# Patient Record
Sex: FEMALE | Race: BLACK OR AFRICAN AMERICAN | Employment: FULL TIME | URBAN - METROPOLITAN AREA
[De-identification: names, ages, dates, MRNs, and addresses within clinical notes are randomized per-mention and may not be internally consistent; named-entity substitution may affect disease eponyms.]

---

## 2018-03-28 ENCOUNTER — OFFICE VISIT (OUTPATIENT)
Dept: INTERNAL MEDICINE CLINIC | Age: 53
End: 2018-03-28

## 2018-03-28 VITALS
DIASTOLIC BLOOD PRESSURE: 89 MMHG | HEIGHT: 61 IN | BODY MASS INDEX: 27.3 KG/M2 | TEMPERATURE: 96.6 F | WEIGHT: 144.6 LBS | HEART RATE: 60 BPM | RESPIRATION RATE: 16 BRPM | SYSTOLIC BLOOD PRESSURE: 130 MMHG | OXYGEN SATURATION: 99 %

## 2018-03-28 DIAGNOSIS — E78.5 HYPERLIPIDEMIA LDL GOAL <100: Primary | ICD-10-CM

## 2018-03-28 DIAGNOSIS — Z78.0 MENOPAUSE: ICD-10-CM

## 2018-03-28 DIAGNOSIS — M81.0 OSTEOPOROSIS, UNSPECIFIED OSTEOPOROSIS TYPE, UNSPECIFIED PATHOLOGICAL FRACTURE PRESENCE: ICD-10-CM

## 2018-03-28 DIAGNOSIS — R73.03 PREDIABETES: ICD-10-CM

## 2018-03-28 RX ORDER — SIMVASTATIN 20 MG/1
20 TABLET, FILM COATED ORAL
Qty: 30 TAB | Refills: 3 | Status: SHIPPED | OUTPATIENT
Start: 2018-03-28 | End: 2018-07-26 | Stop reason: SDUPTHER

## 2018-03-28 RX ORDER — ALENDRONATE SODIUM 70 MG/1
70 TABLET ORAL
Qty: 4 TAB | Refills: 11 | Status: SHIPPED | OUTPATIENT
Start: 2018-03-28 | End: 2018-07-26 | Stop reason: SDUPTHER

## 2018-03-28 RX ORDER — SIMVASTATIN 20 MG/1
TABLET, FILM COATED ORAL
COMMUNITY
End: 2018-03-28 | Stop reason: SDUPTHER

## 2018-03-28 NOTE — PROGRESS NOTES
Chief Complaint   Patient presents with    New Patient     patient [facility] that [facility]

## 2018-03-28 NOTE — MR AVS SNAPSHOT
[facility] [address] 
[phone] Patient: La Nena Pisano 
MRN: [id number] :1965 Visit Information Date & Time Provider Department Dept. Phone Encounter #  
 3/28/2018  3:15 PM Yobany Hall MD [facility] Internal Medicine [phone] [id number] Follow-up Instructions Return in about 3 months (around 2018). Upcoming Health Maintenance Date Due Hepatitis C Screening 1965 DTaP/Tdap/Td series (1 - Tdap) 1984 PAP AKA CERVICAL CYTOLOGY 1984 BREAST CANCER SCRN MAMMOGRAM 2013 FOBT Q 1 YEAR AGE 50-75 2013 Allergies as of 3/28/2018  Review Complete On: 3/28/2018 By: Luis M Venegas MD  
  
 Severity Noted Reaction Type Reactions Advil [Ibuprofen]  2018    Swelling Current Immunizations  Never Reviewed No immunizations on file. Not reviewed this visit You Were Diagnosed With   
  
 Codes Comments Hyperlipidemia LDL goal <100    -  Primary ICD-10-CM: E78.5 ICD-9-CM: 272.4 Osteoporosis, unspecified osteoporosis type, unspecified pathological fracture presence     ICD-10-CM: M81.0 ICD-9-CM: 733.00 Vitals BP Pulse Temp Resp Height(growth percentile) Weight(growth percentile) 130/89 (BP 1 Location: Left arm, BP Patient Position: Sitting) 60 96.6 °F (35.9 °C) (Oral) 16 5' 1\" (1.549 m) 144 lb 9.6 oz (65.6 kg) SpO2 BMI OB Status Smoking Status 99% 27.32 kg/m2 Postmenopausal Former Smoker BMI and BSA Data Body Mass Index Body Surface Area  
 27.32 kg/m 2 1.68 m 2 Preferred Pharmacy Pharmacy Name Phone [id number] [place], [address] [address] [phone] Your Updated Medication List  
  
   
This list is accurate as of 3/28/18  4:08 PM.  Always use your most recent med list.  
  
  
  
  
 alendronate 70 mg tablet Commonly known as:  FOSAMAX Take 1 Tab by mouth every seven (7) days. simvastatin 20 mg tablet Commonly known as:  ZOCOR Take 1 Tab by mouth nightly. Prescriptions Sent to Pharmacy Refills  
 simvastatin (ZOCOR) 20 mg tablet 3 Sig: Take 1 Tab by mouth nightly. Class: Normal  
 Pharmacy: [facility][id number] Foss, [address] [address] Ph #: [phone] Route: Oral  
 alendronate (FOSAMAX) 70 mg tablet 11 Sig: Take 1 Tab by mouth every seven (7) days. Class: Normal  
 Pharmacy: [facility] #[id number]- Matilda Foss, [address] [address] Ph #: [phone] Route: Oral  
  
Follow-up Instructions Return in about 3 months (around 6/28/2018). Introducing [facility] [place] [facility]! Artemio Solano introduces [employer] patient portal. Now you can access parts of your medical record, email your doctor's office, and request medication refills online. 1. In your internet browser, go to [email or web address] 2. Click on the First Time User? Click Here link in the Sign In box. You will see the New Member Sign Up page. 3. Enter your Human[employer] Access Code exactly as it appears below. You will not need to use this code after youve completed the sign-up process. If you do not sign up before the expiration date, you must request a new code. · Human[employer] Access Code: [email or web address] Expires: 6/26/2018  4:08 PM 
 
4. Enter the last four digits of your Social Security Number (xxxx) and Date of Birth (mm/dd/yyyy) as indicated and click Submit. You will be taken to the next sign-up page. 5. Create a ControlCirclet ID. This will be your HumanAPI login ID and cannot be changed, so think of one that is secure and easy to remember. 6. Create a ControlCirclet password. You can change your password at any time. 7. Enter your Password Reset Question and Answer. This can be used at a later time if you forget your password. 8. Enter your e-mail address. You will receive e-mail notification when new information is available in [address] 9. Click Sign Up. You can now view and download portions of your medical record. 10. Click the Download Summary menu link to download a portable copy of your medical information. If you have questions, please visit the Frequently Asked Questions section of the [employer] website. Remember, [employer] is NOT to be used for urgent needs. For medical emergencies, dial 911. Now available from your iPhone and Android! Please provide this summary of care documentation to your next provider. If you have any questions after today's visit, please call [phone].

## 2018-03-28 NOTE — PROGRESS NOTES
Subjective:     Chief Complaint   Patient presents with   [facility] Patient     patient states that her doctor went to [facility]        She  is a 45y.o. year old female who presents today as a new patient to [facility]. Her past medical history is significant for osteoporosis, hyperlipidemia. States that she was diagnosed with osteoporosis 14 months ago and since then she has been taking Fosamax. No side effect. States that she has been taking lipitor for many years. Not sure when is the last time she had blood work done. Patient also reports that she was diagnosed with borderline diabetes. Thyroid abnormality. She was evaluated by endocrinologist. Not taking any medication. Patient states that she has anastasia experiencing menopausal symptoms. Would like to get a referral for Ob-Gyn. A comprehensive review of systems was negative except for that written in the HPI.   Objective:     Vitals:    03/28/18 1531   BP: 130/89   Pulse: 60   Resp: 16   Temp: 96.6 °F (35.9 °C)   TempSrc: Oral   SpO2: 99%   Weight: 144 lb 9.6 oz (65.6 kg)   Height: 5' 1\" (1.549 m)       Physical Examination: General appearance - alert, well appearing, and in no distress, oriented to person, place, and time and normal appearing weight  Mental status - alert, oriented to person, place, and time, normal mood, behavior, speech, dress, motor activity, and thought processes  Ears - bilateral TM's and external ear canals normal  Nose - normal and patent, no erythema, discharge or polyps  Mouth - mucous membranes moist, pharynx normal without lesions  Neck - supple, no significant adenopathy  Chest - clear to auscultation, no wheezes, rales or rhonchi, symmetric air entry  Heart - normal rate, regular rhythm, normal S1, S2, no murmurs, rubs, clicks or gallops  Neurological - alert, oriented, normal speech, no focal findings or movement disorder noted  Extremities - peripheral pulses normal, no pedal edema, no clubbing or cyanosis    Allergies   Allergen Reactions    Advil [Ibuprofen] Swelling      Social History     Social History    Marital status: SINGLE     Spouse name: N/A    Number of children: N/A    Years of education: N/A     Social History Main Topics    Smoking status: Former Smoker    Smokeless tobacco: Never Used    Alcohol use Yes    Drug use: No    Sexual activity: Yes     Partners: Male     Other Topics Concern    None     Social History Narrative    None      History reviewed. No pertinent family history. Past Surgical History:   Procedure Laterality Date    HX COLONOSCOPY      internal hemroid 2009    HX HERNIA REPAIR  2004      Past Medical History:   Diagnosis Date    Hypercholesterolemia       Current Outpatient Prescriptions   Medication Sig Dispense Refill    simvastatin (ZOCOR) 20 mg tablet Take 1 Tab by mouth nightly. 30 Tab 3    alendronate (FOSAMAX) 70 mg tablet Take 1 Tab by mouth every seven (7) days. 4 Tab 11        Assessment/ Plan:   Diagnoses and all orders for this visit:    1. Hyperlipidemia LDL goal <100  -     simvastatin (ZOCOR) 20 mg tablet; Take 1 Tab by mouth nightly. 2. Osteoporosis, unspecified osteoporosis type, unspecified pathological fracture presence  -     alendronate (FOSAMAX) 70 mg tablet; Take 1 Tab by mouth every seven (7) days. 3. Menopause  Cyndie Chandu [facility] ref [facility]    4. Prediabetes       Asked patient to sign medical release form to obtain records from her previous PCP. Depending on her lab results we will consider rechecking labs. Medication risks/benefits/costs/interactions/alternatives discussed with patient. Advised patient to call back or return to office if symptoms worsen/change/persist. If patient cannot reach us or should anything more severe/urgent arise he/she should proceed directly to the nearest emergency department. Discussed expected course/resolution/complications of diagnosis in detail with patient.   Patient given a written after visit summary which includes her diagnoses, current medications and vitals. Patient expressed understanding with the diagnosis and plan. Follow-up Disposition:  Return in about 3 months (around 6/28/2018).

## 2018-04-11 ENCOUNTER — TELEPHONE (OUTPATIENT)
Dept: INTERNAL MEDICINE CLINIC | Age: 53
End: 2018-04-11

## 2018-07-09 ENCOUNTER — TELEPHONE (OUTPATIENT)
Dept: INTERNAL MEDICINE CLINIC | Age: 53
End: 2018-07-09

## 2018-07-09 NOTE — TELEPHONE ENCOUNTER
Pt called and stated she needed to get a mammogram done, could not because Dr. Barbara Rodriguez had not signed forms that were sent. Wanted to know if forms were signed and sent back. She did not know any information about forms, just knew we needed forms signed for them to do a diagnostic mammogram.

## 2018-07-16 ENCOUNTER — LAB ONLY (OUTPATIENT)
Dept: INTERNAL MEDICINE CLINIC | Age: 53
End: 2018-07-16

## 2018-07-16 DIAGNOSIS — M81.0 OSTEOPOROSIS, UNSPECIFIED OSTEOPOROSIS TYPE, UNSPECIFIED PATHOLOGICAL FRACTURE PRESENCE: ICD-10-CM

## 2018-07-16 DIAGNOSIS — Z00.00 WELL ADULT ON ROUTINE HEALTH CHECK: Primary | ICD-10-CM

## 2018-07-18 ENCOUNTER — TELEPHONE (OUTPATIENT)
Dept: INTERNAL MEDICINE CLINIC | Age: 53
End: 2018-07-18

## 2018-07-18 LAB
25(OH)D3+25(OH)D2 SERPL-MCNC: 22.1 NG/ML (ref 30–100)
ALBUMIN SERPL-MCNC: 4.9 G/DL (ref 3.5–5.5)
ALBUMIN/GLOB SERPL: 1.8 {RATIO} (ref 1.2–2.2)
ALP SERPL-CCNC: 75 IU/L (ref 39–117)
ALT SERPL-CCNC: 14 IU/L (ref 0–32)
AST SERPL-CCNC: 20 IU/L (ref 0–40)
BASOPHILS # BLD AUTO: 0 X10E3/UL (ref 0–0.2)
BASOPHILS NFR BLD AUTO: 0 %
BILIRUB SERPL-MCNC: 0.4 MG/DL (ref 0–1.2)
BUN SERPL-MCNC: 8 MG/DL (ref 6–24)
BUN/CREAT SERPL: 13 (ref 9–23)
CALCIUM SERPL-MCNC: 9.6 MG/DL (ref 8.7–10.2)
CHLORIDE SERPL-SCNC: 97 MMOL/L (ref 96–106)
CHOLEST SERPL-MCNC: 240 MG/DL (ref 100–199)
CO2 SERPL-SCNC: 24 MMOL/L (ref 20–29)
CREAT SERPL-MCNC: 0.63 MG/DL (ref 0.57–1)
EOSINOPHIL # BLD AUTO: 0.1 X10E3/UL (ref 0–0.4)
EOSINOPHIL NFR BLD AUTO: 2 %
ERYTHROCYTE [DISTWIDTH] IN BLOOD BY AUTOMATED COUNT: 14 % (ref 12.3–15.4)
EST. AVERAGE GLUCOSE BLD GHB EST-MCNC: 117 MG/DL
GLOBULIN SER CALC-MCNC: 2.7 G/DL (ref 1.5–4.5)
GLUCOSE SERPL-MCNC: 85 MG/DL (ref 65–99)
HBA1C MFR BLD: 5.7 % (ref 4.8–5.6)
HCT VFR BLD AUTO: 39.2 % (ref 34–46.6)
HDLC SERPL-MCNC: 67 MG/DL
HGB BLD-MCNC: 12.7 G/DL (ref 11.1–15.9)
IMM GRANULOCYTES # BLD: 0 X10E3/UL (ref 0–0.1)
IMM GRANULOCYTES NFR BLD: 0 %
INTERPRETATION, 910389: NORMAL
LDLC SERPL CALC-MCNC: 146 MG/DL (ref 0–99)
LYMPHOCYTES # BLD AUTO: 2.5 X10E3/UL (ref 0.7–3.1)
LYMPHOCYTES NFR BLD AUTO: 42 %
MCH RBC QN AUTO: 29.6 PG (ref 26.6–33)
MCHC RBC AUTO-ENTMCNC: 32.4 G/DL (ref 31.5–35.7)
MCV RBC AUTO: 91 FL (ref 79–97)
MONOCYTES # BLD AUTO: 0.3 X10E3/UL (ref 0.1–0.9)
MONOCYTES NFR BLD AUTO: 5 %
NEUTROPHILS # BLD AUTO: 3.1 X10E3/UL (ref 1.4–7)
NEUTROPHILS NFR BLD AUTO: 51 %
PLATELET # BLD AUTO: 187 X10E3/UL (ref 150–379)
POTASSIUM SERPL-SCNC: 4.8 MMOL/L (ref 3.5–5.2)
PROT SERPL-MCNC: 7.6 G/DL (ref 6–8.5)
RBC # BLD AUTO: 4.29 X10E6/UL (ref 3.77–5.28)
SODIUM SERPL-SCNC: 140 MMOL/L (ref 134–144)
T4 FREE SERPL-MCNC: 1.41 NG/DL (ref 0.82–1.77)
TRIGL SERPL-MCNC: 135 MG/DL (ref 0–149)
TSH SERPL DL<=0.005 MIU/L-ACNC: 1.3 UIU/ML (ref 0.45–4.5)
VLDLC SERPL CALC-MCNC: 27 MG/DL (ref 5–40)
WBC # BLD AUTO: 6 X10E3/UL (ref 3.4–10.8)

## 2018-07-18 NOTE — PROGRESS NOTES
Please make sure she has an appointment for physical as well as to discuss lab. Will discuss labs in her CPE appointment.

## 2018-07-26 ENCOUNTER — OFFICE VISIT (OUTPATIENT)
Dept: INTERNAL MEDICINE CLINIC | Age: 53
End: 2018-07-26

## 2018-07-26 VITALS
BODY MASS INDEX: 28.13 KG/M2 | HEART RATE: 56 BPM | RESPIRATION RATE: 18 BRPM | OXYGEN SATURATION: 100 % | TEMPERATURE: 98 F | WEIGHT: 149 LBS | DIASTOLIC BLOOD PRESSURE: 75 MMHG | HEIGHT: 61 IN | SYSTOLIC BLOOD PRESSURE: 124 MMHG

## 2018-07-26 DIAGNOSIS — E55.9 VITAMIN D DEFICIENCY: ICD-10-CM

## 2018-07-26 DIAGNOSIS — G56.03 BILATERAL CARPAL TUNNEL SYNDROME: ICD-10-CM

## 2018-07-26 DIAGNOSIS — Z12.11 SCREEN FOR COLON CANCER: ICD-10-CM

## 2018-07-26 DIAGNOSIS — Z00.00 WELL ADULT ON ROUTINE HEALTH CHECK: Primary | ICD-10-CM

## 2018-07-26 DIAGNOSIS — E78.5 HYPERLIPIDEMIA LDL GOAL <100: ICD-10-CM

## 2018-07-26 DIAGNOSIS — M81.0 OSTEOPOROSIS, UNSPECIFIED OSTEOPOROSIS TYPE, UNSPECIFIED PATHOLOGICAL FRACTURE PRESENCE: ICD-10-CM

## 2018-07-26 RX ORDER — ERGOCALCIFEROL 1.25 MG/1
50000 CAPSULE ORAL
Qty: 12 CAP | Refills: 1 | Status: SHIPPED | OUTPATIENT
Start: 2018-07-26 | End: 2019-04-16 | Stop reason: SDUPTHER

## 2018-07-26 RX ORDER — SIMVASTATIN 20 MG/1
20 TABLET, FILM COATED ORAL
Qty: 30 TAB | Refills: 6 | Status: SHIPPED | OUTPATIENT
Start: 2018-07-26 | End: 2019-04-28 | Stop reason: SDUPTHER

## 2018-07-26 RX ORDER — NAPROXEN 500 MG/1
500 TABLET ORAL 2 TIMES DAILY WITH MEALS
Qty: 30 TAB | Refills: 0 | Status: SHIPPED | OUTPATIENT
Start: 2018-07-26 | End: 2019-04-15 | Stop reason: ALTCHOICE

## 2018-07-26 RX ORDER — ALENDRONATE SODIUM 70 MG/1
70 TABLET ORAL
Qty: 4 TAB | Refills: 11 | Status: SHIPPED | OUTPATIENT
Start: 2018-07-26 | End: 2019-09-09 | Stop reason: SINTOL

## 2018-07-26 NOTE — MR AVS SNAPSHOT
[address] [place] [address] 
[phone] Patient: Ciro Barros 
MRN: [id number] :1965 Visit Information Date & Time Provider Department Dept. Phone Encounter #  
 2018  2:15 PM Yobany Ramos MD [facility] Internal Medicine [phone] [id number] Follow-up Instructions Return in about 6 months (around 2019), or if symptoms worsen or fail to improve. Upcoming Health Maintenance Date Due Hepatitis C Screening 1965 COLONOSCOPY 1981 DTaP/Tdap/Td series (1 - Tdap) 1984 PAP AKA CERVICAL CYTOLOGY 1984 Influenza Age 5 to Adult 2018 BREAST CANCER SCRN MAMMOGRAM 4/3/2019 Allergies as of 2018  Review Complete On: 2018 By: Leena Navas LPN Severity Noted Reaction Type Reactions Advil [Ibuprofen]  2018    Swelling Current Immunizations  Never Reviewed No immunizations on file. Not reviewed this visit You Were Diagnosed With   
  
 Codes Comments Well adult on routine health check    -  Primary ICD-10-CM: Z00.00 ICD-9-CM: V70.0 Screen for colon cancer     ICD-10-CM: Z12.11 ICD-9-CM: V76.51 Bilateral carpal tunnel syndrome     ICD-10-CM: G56.03 
ICD-9-CM: 354.0 Vitamin D deficiency     ICD-10-CM: E55.9 ICD-9-CM: 268.9 Hyperlipidemia LDL goal <100     ICD-10-CM: E78.5 ICD-9-CM: 272.4 Osteoporosis, unspecified osteoporosis type, unspecified pathological fracture presence     ICD-10-CM: M81.0 ICD-9-CM: 733.00 Vitals BP Pulse Temp Resp Height(growth percentile) Weight(growth percentile) 124/75 (BP 1 Location: Left arm, BP Patient Position: Sitting) (!) 56 98 °F (36.7 °C) (Temporal) 18 5' 1\" (1.549 m) 149 lb (67.6 kg) SpO2 BMI OB Status Smoking Status 100% 28.15 kg/m2 Postmenopausal Former Smoker Vitals History BMI and BSA Data Body Mass Index Body Surface Area 28.15 kg/m 2 1.71 m 2 [id number] [address] [place] [address] [phone] Your Updated Medication List  
  
   
This list is accurate as of 7/26/18  2:53 PM.  Always use your most recent med list.  
  
  
  
  
 alendronate 70 mg tablet Commonly known as:  FOSAMAX Take 1 Tab by mouth every seven (7) days. ergocalciferol 50,000 unit capsule Commonly known as:  ERGOCALCIFEROL Take 1 Cap by mouth every seven (7) days. naproxen 500 mg tablet Commonly known as:  NAPROSYN Take 1 Tab by mouth two (2) times daily (with meals). simvastatin 20 mg tablet Commonly known as:  ZOCOR Take 1 Tab by mouth nightly. Prescriptions Sent to Pharmacy Refills  
 naproxen (NAPROSYN) 500 mg tablet 0 Sig: Take 1 Tab by mouth two (2) times daily (with meals). Class: Normal  
 Pharmacy: [facility]acy #[id number] [address] [address] Ph #: [phone] Route: Oral  
 ergocalciferol (ERGOCALCIFEROL) 50,000 unit capsule 1 Sig: Take 1 Cap by mouth every seven (7) days. Class: Normal  
 Pharmacy: [facility]acy #[id number] [address] [address] Ph #: [phone] Route: Oral  
 simvastatin (ZOCOR) 20 mg tablet 6 Sig: Take 1 Tab by mouth nightly. Class: Normal  
 Pharmacy: [facility]/pharmacy #[id number] [address] [address] Ph #: [phone] Route: Oral  
 alendronate (FOSAMAX) 70 mg tablet 11 Sig: Take 1 Tab by mouth every seven (7) days. Class: Normal  
 Pharmacy: [facility]/pharmacy #[id number] [address] [address] Ph #: [phone] Route: Oral  
  
We Performed the Following REFERRAL FOR COLONOSCOPY [[id number] Custom] REFERRAL TO OBSTETRICS AND GYNECOLOGY [REF51 Custom] Follow-up Instructions Return in about 6 months (around 1/26/2019), or if symptoms worsen or fail to improve. Referral Information Referral ID Referred By Referred To  
  
 [id number] REGGIE MATT MD   
   [address] [facility] [address] Phone: [phone] Fax: [phone] Visits Status Start Date End Date 1 New Request 7/26/18 7/26/19 If your referral has a status of pending review or denied, additional information will be sent to support the outcome of this decision. Referral ID Referred By Referred To  
 [id number] REGGIE MATT MD  
   [address] Phone: [phone] Fax: [phone] Visits Status Start Date End Date 1 New Request 7/26/18 7/26/19 If your referral has a status of pending review or denied, additional information will be sent to support the outcome of this decision. Introducing \[facility] [place] [facility]\"" & [facility] SERVICES! Madelyn Jimenez introduces [employer] patient portal. Now you can access parts of your medical record, email your doctor's office, and request medication refills online. 1. In your internet browser, go to [email or web address] 2. Click on the First Time User? Click Here link in the Sign In box. You will see the New Member Sign Up page. 3. Enter your [employer] Access Code exactly as it appears below. You will not need to use this code after youve completed the sign-up process. If you do not sign up before the expiration date, you must request a new code. · [employer] Access Code: [email or web address] Expires: 10/24/2018  2:20 PM 
 
4. Enter the last four digits of your Social Security Number (xxxx) and Date of Birth (mm/dd/yyyy) as indicated and click Submit. You will be taken to the next sign-up page. 5. Create a Qurit ID. This will be your [employer] login ID and cannot be changed, so think of one that is secure and easy to remember. 6. Create a Hidden Radio password. You can change your password at any time. 7. Enter your Password Reset Question and Answer. This can be used at a later time if you forget your password. 8. Enter your e-mail address. You will receive e-mail notification when new information is available in [address] 9. Click Sign Up. You can now view and download portions of your medical record. 10. Click the Download Summary menu link to download a portable copy of your medical information. If you have questions, please visit the Frequently Asked Questions section of the [employer] website. Remember, [employer] is NOT to be used for urgent needs. For medical emergencies, dial 911. Now available from your iPhone and Android! Please provide this summary of care documentation to your next provider. If you have any questions after today's visit, please call [phone].

## 2018-07-27 NOTE — PROGRESS NOTES
Subjective:   52 y.o. female for Well Woman Check as well as to discuss lab results. Her medical history is significant for osteoporosis, HLD. HLD: she is suppose to be on Zocor 20 mg but she states that she has not taken the med for the past 3-4 months. Osteoporosis: not taking Fosamax for the past 3-4 months. H/o low vit d. Not taking any supplement. Exercise regularly. She is also here with a c/o numbness feeling in her left hand more than right hand. Denies any week ness. Reports that its a constant feeling. Denies any neck, shoulder pain. 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 Need referral for Gyn for gyn exam.     Lab Results   Component Value Date/Time    VITAMIN D, 25-HYDROXY 22.1 (L) 07/16/2018 02:52 PM         Lab Results  Component Value Date/Time   WBC 6.0 07/16/2018 02:52 PM   HGB 12.7 07/16/2018 02:52 PM   HCT 39.2 07/16/2018 02:52 PM   PLATELET 494 45/01/3251 02:52 PM   MCV 91 07/16/2018 02:52 PM     Lab Results  Component Value Date/Time   Cholesterol, total 240 (H) 07/16/2018 02:52 PM   HDL Cholesterol 67 07/16/2018 02:52 PM   LDL, calculated 146 (H) 07/16/2018 02:52 PM   Triglyceride 135 07/16/2018 02:52 PM     Lab Results  Component Value Date/Time   ALT (SGPT) 14 07/16/2018 02:52 PM   AST (SGOT) 20 07/16/2018 02:52 PM   Alk.  phosphatase 75 07/16/2018 02:52 PM   Bilirubin, total 0.4 07/16/2018 02:52 PM   Albumin 4.9 07/16/2018 02:52 PM   Protein, total 7.6 07/16/2018 02:52 PM   PLATELET 890 17/38/5288 02:52 PM       Lab Results  Component Value Date/Time   GFR est non- 07/16/2018 02:52 PM   GFR est  07/16/2018 02:52 PM   Creatinine 0.63 07/16/2018 02:52 PM   BUN 8 07/16/2018 02:52 PM   Sodium 140 07/16/2018 02:52 PM   Potassium 4.8 07/16/2018 02:52 PM   Chloride 97 07/16/2018 02:52 PM   CO2 24 07/16/2018 02:52 PM     Lab Results  Component Value Date/Time   TSH 1.300 07/16/2018 02:52 PM   T4, Free 1.41 07/16/2018 02:52 PM      Lab Results   Component Value Date/Time    Hemoglobin A1c 5.7 (H) 07/16/2018 02:52 PM            Patient Active Problem List   Diagnosis Code    Hyperlipidemia LDL goal <100 E78.5    Osteoporosis M81.0    Prediabetes R73.03     Current Outpatient Prescriptions   Medication Sig Dispense Refill    naproxen (NAPROSYN) 500 mg tablet Take 1 Tab by mouth two (2) times daily (with meals). 30 Tab 0    ergocalciferol (ERGOCALCIFEROL) 50,000 unit capsule Take 1 Cap by mouth every seven (7) days. 12 Cap 1    simvastatin (ZOCOR) 20 mg tablet Take 1 Tab by mouth nightly. 30 Tab 6    alendronate (FOSAMAX) 70 mg tablet Take 1 Tab by mouth every seven (7) days. 4 Tab 11     Allergies   Allergen Reactions    Advil [Ibuprofen] Swelling     Past Medical History:   Diagnosis Date    Hypercholesterolemia      Past Surgical History:   Procedure Laterality Date    HX COLONOSCOPY      internal hemroid 2009    HX HERNIA REPAIR  2004     No family history on file. Social History   Substance Use Topics    Smoking status: Former Smoker    Smokeless tobacco: Never Used    Alcohol use Yes             ROS: refer to HPI. Feeling generally well. No TIA's or unusual headaches, no dysphagia. No prolonged cough. No dyspnea or chest pain on exertion. No abdominal pain, change in bowel habits, black or bloody stools. No urinary tract symptoms. No new or unusual musculoskeletal symptoms. Specific concerns today: refer to HPI    Objective: The patient appears well, alert, oriented x 3, in no distress. Visit Vitals    /75 (BP 1 Location: Left arm, BP Patient Position: Sitting)    Pulse (!) 56    Temp 98 °F (36.7 °C) (Temporal)    Resp 18    Ht 5' 1\" (1.549 m)    Wt 149 lb (67.6 kg)    SpO2 100%    BMI 28.15 kg/m2     ENT normal.  Neck supple. No adenopathy or thyromegaly. LAUREL. Lungs are clear, good air entry, no wheezes, rhonchi or rales. S1 and S2 normal, no murmurs, regular rate and rhythm.  Abdomen soft without tenderness, guarding, mass or organomegaly. Extremities show no edema, normal peripheral pulses. Shoulder, elbow, wrist joit exam is normal.  Tinel test was positive for tingling in all her fingers of the left hand. Neurological is normal, no focal findings. Breast and Pelvic exams are deferred. Assessment/Plan:   Well Woman  lose weight, increase physical activity, follow low fat diet, follow low salt diet, routine labs ordered, call if any problems, med compliance discussed. ICD-10-CM ICD-9-CM    1. Well adult on routine health check Z00.00 V70.0 REFERRAL FOR COLONOSCOPY      REFERRAL TO OBSTETRICS AND GYNECOLOGY   2. Screen for colon cancer Z12.11 V76.51 REFERRAL FOR COLONOSCOPY   3. Bilateral carpal tunnel syndrome G56.03 354.0    4. Vitamin D deficiency E55.9 268.9 ergocalciferol (ERGOCALCIFEROL) 50,000 unit capsule   5. Hyperlipidemia LDL goal <100 E78.5 272.4 simvastatin (ZOCOR) 20 mg tablet   6. Osteoporosis, unspecified osteoporosis type, unspecified pathological fracture presence M81.0 733.00 alendronate (FOSAMAX) 70 mg tablet     Diagnoses and all orders for this visit:    1. Well adult on routine health check  -     [facility] ref [facility]    2. Screen for colon cancer  -     REFERRAL FOR COLONOSCOPY    3. Bilateral carpal tunnel syndrome  -   start  naproxen (NAPROSYN) 500 mg tablet; Take 1 Tab by mouth two (2) times daily (with meals). - advised hand brace. - key board position to avoid compression of nerve. - will consider EMG if needed. 4. Vitamin D deficiency  -    Start  ergocalciferol (ERGOCALCIFEROL) 50,000 unit capsule; Take 1 Cap by mouth every seven (7) days. 5. Hyperlipidemia LDL goal <100  -     Restart simvastatin (ZOCOR) 20 mg tablet; Take 1 Tab by mouth nightly. 6. Osteoporosis, unspecified osteoporosis type, unspecified pathological fracture presence  -     Restart alendronate (FOSAMAX) 70 mg tablet; Take 1 Tab by mouth every seven (7) days. Records requested from last PCP. Follow-up Disposition:  Return in about 6 months (around 1/26/2019), or if symptoms worsen or fail to improve.   reviewed diet, exercise and weight control  cardiovascular risk and specific lipid/LDL goals reviewed  reviewed medications and side effects in detail

## 2019-04-15 ENCOUNTER — OFFICE VISIT (OUTPATIENT)
Dept: PRIMARY CARE CLINIC | Age: 54
End: 2019-04-15

## 2019-04-15 VITALS
HEART RATE: 56 BPM | RESPIRATION RATE: 17 BRPM | TEMPERATURE: 97.8 F | DIASTOLIC BLOOD PRESSURE: 78 MMHG | BODY MASS INDEX: 28.09 KG/M2 | SYSTOLIC BLOOD PRESSURE: 125 MMHG | OXYGEN SATURATION: 100 % | WEIGHT: 148.8 LBS | HEIGHT: 61 IN

## 2019-04-15 DIAGNOSIS — R73.03 PREDIABETES: ICD-10-CM

## 2019-04-15 DIAGNOSIS — K21.9 GASTROESOPHAGEAL REFLUX DISEASE, ESOPHAGITIS PRESENCE NOT SPECIFIED: ICD-10-CM

## 2019-04-15 DIAGNOSIS — E78.5 HYPERLIPIDEMIA LDL GOAL <100: ICD-10-CM

## 2019-04-15 DIAGNOSIS — R79.89 LOW VITAMIN D LEVEL: Primary | ICD-10-CM

## 2019-04-15 RX ORDER — RANITIDINE 150 MG/1
150 TABLET, FILM COATED ORAL
Qty: 30 TAB | Refills: 1 | Status: SHIPPED | OUTPATIENT
Start: 2019-04-15 | End: 2019-07-06 | Stop reason: SDUPTHER

## 2019-04-15 NOTE — PROGRESS NOTES
Subjective: Chief Complaint Patient presents with  Complete Physical  
 Referral Request  
  with obgyn She  is a 52y.o. year old female with medical history is significant for osteoporosis, HLD, low vit D is here for 6 months follow up. She is not due for physical.  
 
HLD: She is on Zocor 20 mg daily for HLD. Tolerating the med fine. Osteoporosis: she is not taking the fosamax and she does not want to take the med. Patient reports that she has been experiencing heart burn especially at night and in AM. She denies any triggering factors. She does mention that she eats lot of spicy food though. Denies any N/V, diarrhea. States that she takes Ranitidine sometimes which helps with symptoms. H/o low vit d. Not taking any supplement. Lab Results Component Value Date/Time VITAMIN D, 25-HYDROXY 22.1 (L) 07/16/2018 02:52 PM  
  
 
Lab Results Component Value Date/Time Hemoglobin A1c 5.7 (H) 07/16/2018 02:52 PM  
 
  
 
Exercises regularly. Pertinent items are noted in HPI. Objective:  
 
Vitals:  
 04/15/19 1280 BP: 125/78 Pulse: (!) 56 Resp: 17 Temp: 97.8 °F (36.6 °C) TempSrc: Oral  
SpO2: 100% Weight: 148 lb 12.8 oz (67.5 kg) Height: 5' 1\" (1.549 m) Physical Examination: General appearance - alert, well appearing, and in no distress, oriented to person, place, and time and overweight Mental status - alert, oriented to person, place, and time, normal mood, behavior, speech, dress, motor activity, and thought processes Chest - clear to auscultation, no wheezes, rales or rhonchi, symmetric air entry Heart - normal rate, regular rhythm, normal S1, S2, no murmurs, rubs, clicks or gallops Abdomen - soft, nontender, nondistended, no masses or organomegaly Allergies Allergen Reactions  Advil [Ibuprofen] Swelling Social History Socioeconomic History  Marital status:  Spouse name: Not on file  Number of children: Not on file  Years of education: Not on file  Highest education level: Not on file Tobacco Use  Smoking status: Former Smoker  Smokeless tobacco: Never Used Substance and Sexual Activity  Alcohol use: Yes  Drug use: No  
 Sexual activity: Yes  
  Partners: Male No family history on file. Past Surgical History:  
Procedure Laterality Date  HX COLONOSCOPY    
 internal hemroid 2009  HX HERNIA REPAIR  2004 Past Medical History:  
Diagnosis Date  Hypercholesterolemia Current Outpatient Medications Medication Sig Dispense Refill  simvastatin (ZOCOR) 20 mg tablet Take 1 Tab by mouth nightly. 30 Tab 6  ergocalciferol (ERGOCALCIFEROL) 50,000 unit capsule Take 1 Cap by mouth every seven (7) days. 12 Cap 1  
 alendronate (FOSAMAX) 70 mg tablet Take 1 Tab by mouth every seven (7) days. 4 Tab 11 Assessment/ Plan:  
Diagnoses and all orders for this visit: 1. Low vitamin D level -    She is not taking any vit D supplement. Will notify the result and further recommendation. VITAMIN D, 25 HYDROXY 2. Prediabetes -   Continue work on diet and exercise. HEMOGLOBIN A1C WITH EAG 3. Hyperlipidemia LDL goal <100 
-     LIPID PANEL 
-     METABOLIC PANEL, COMPREHENSIVE 
-    Continue zocor, will notify if any change needed. 4. Gastroesophageal reflux disease, esophagitis presence not specified -   Counseled on avoiding fatty , spicy, macias food. Start daily   raNITIdine (ZANTAC) 150 mg tablet; Take 1 Tab by mouth nightly for a month . Follow up if symptoms not better. Will consider referral to GI. Patient did not ask me any referral for Gyn during the encounter. Medication risks/benefits/costs/interactions/alternatives discussed with patient.  
Advised patient to call back or return to office if symptoms worsen/change/persist. If patient cannot reach us or should anything more severe/urgent arise he/she should proceed directly to the nearest emergency department. Discussed expected course/resolution/complications of diagnosis in detail with patient. Patient given a written after visit summary which includes her diagnoses, current medications and vitals. Patient expressed understanding with the diagnosis and plan. Follow-up and Dispositions · Return in about 3 months (around 7/29/2019) for complete physical  and fasting blood work. Cl Velázquez

## 2019-04-15 NOTE — PATIENT INSTRUCTIONS

## 2019-04-16 DIAGNOSIS — E55.9 VITAMIN D DEFICIENCY: ICD-10-CM

## 2019-04-16 LAB
25(OH)D3+25(OH)D2 SERPL-MCNC: 18 NG/ML (ref 30–100)
ALBUMIN SERPL-MCNC: 4.6 G/DL (ref 3.5–5.5)
ALBUMIN/GLOB SERPL: 1.9 {RATIO} (ref 1.2–2.2)
ALP SERPL-CCNC: 77 IU/L (ref 39–117)
ALT SERPL-CCNC: 18 IU/L (ref 0–32)
AST SERPL-CCNC: 21 IU/L (ref 0–40)
BILIRUB SERPL-MCNC: 0.5 MG/DL (ref 0–1.2)
BUN SERPL-MCNC: 8 MG/DL (ref 6–24)
BUN/CREAT SERPL: 14 (ref 9–23)
CALCIUM SERPL-MCNC: 9.4 MG/DL (ref 8.7–10.2)
CHLORIDE SERPL-SCNC: 102 MMOL/L (ref 96–106)
CHOLEST SERPL-MCNC: 204 MG/DL (ref 100–199)
CO2 SERPL-SCNC: 26 MMOL/L (ref 20–29)
CREAT SERPL-MCNC: 0.59 MG/DL (ref 0.57–1)
EST. AVERAGE GLUCOSE BLD GHB EST-MCNC: 117 MG/DL
GLOBULIN SER CALC-MCNC: 2.4 G/DL (ref 1.5–4.5)
GLUCOSE SERPL-MCNC: 94 MG/DL (ref 65–99)
HBA1C MFR BLD: 5.7 % (ref 4.8–5.6)
HDLC SERPL-MCNC: 64 MG/DL
LDLC SERPL CALC-MCNC: 124 MG/DL (ref 0–99)
POTASSIUM SERPL-SCNC: 4.8 MMOL/L (ref 3.5–5.2)
PROT SERPL-MCNC: 7 G/DL (ref 6–8.5)
SODIUM SERPL-SCNC: 141 MMOL/L (ref 134–144)
TRIGL SERPL-MCNC: 82 MG/DL (ref 0–149)
VLDLC SERPL CALC-MCNC: 16 MG/DL (ref 5–40)

## 2019-04-16 RX ORDER — ERGOCALCIFEROL 1.25 MG/1
50000 CAPSULE ORAL
Qty: 8 CAP | Refills: 0 | Status: SHIPPED | OUTPATIENT
Start: 2019-04-16 | End: 2020-06-10

## 2019-04-16 NOTE — PROGRESS NOTES
Please call patient: 
 
1. Kidney, liver level is within normal range. 2. Over all cholesterol level is better and stable. Continue current med and  Continue watching your diet, eat healthy, less macias and fatty food, more baked or grilled or broiled food. Exercise 150 minutes/week will be helpful as well. Will recheck level in 6 months. 3. Vit d level is low and lower than last time. Start taking Vit D 50,000 IU one tab per week for 8 weeks followed by OTC 2,000 IU vit D daily. Increase vit D rich food. Exposed to sun total of 60 minutes /week will help. Will recheck level in 6 months. Prescription sent to his pharmacy. 4. Prediabetes: Average blood sugar( Hg A1c ) is 5.7, no change from last time, in the range of prediabetic level. Prediabetes is a warning sign that you are at risk for getting type 2 diabetes. It means that your blood sugar is higher than it should be. Most people who get type 2 diabetes have prediabetes first. The good news is that lifestyle changes may help you get your blood sugar back to normal and avoid or delay diabetes. Will recheck this level in 6 months

## 2019-04-17 ENCOUNTER — TELEPHONE (OUTPATIENT)
Dept: PRIMARY CARE CLINIC | Age: 54
End: 2019-04-17

## 2019-04-17 NOTE — TELEPHONE ENCOUNTER
----- Message from Myra Guzman MD sent at 4/16/2019  4:49 PM EDT -----  Please call patient:    1. Kidney, liver level is within normal range. 2. Over all cholesterol level is better and stable. Continue current med and  Continue watching your diet, eat healthy, less macias and fatty food, more baked or grilled or broiled food. Exercise 150 minutes/week will be helpful as well. Will recheck level in 6 months. 3. Vit d level is low and lower than last time. Start taking Vit D 50,000 IU one tab per week for 8 weeks followed by OTC 2,000 IU vit D daily. Increase vit D rich food. Exposed to sun total of 60 minutes /week will help. Will recheck level in 6 months. Prescription sent to his pharmacy. 4. Prediabetes: Average blood sugar( Hg A1c ) is 5.7, no change from last time, in the range of prediabetic level. Prediabetes is a warning sign that you are at risk for getting type 2 diabetes. It means that your blood sugar is higher than it should be. Most people who get type 2 diabetes have prediabetes first. The good news is that lifestyle changes may help you get your blood sugar back to normal and avoid or delay diabetes.   Will recheck this level in 6 months

## 2019-04-17 NOTE — TELEPHONE ENCOUNTER
Discussed results and recommendations. Pt verbalized her understanding. Will like results to be mailed to her home.

## 2019-04-28 DIAGNOSIS — E78.5 HYPERLIPIDEMIA LDL GOAL <100: ICD-10-CM

## 2019-04-29 RX ORDER — SIMVASTATIN 20 MG/1
TABLET, FILM COATED ORAL
Qty: 30 TAB | Refills: 5 | Status: SHIPPED | OUTPATIENT
Start: 2019-04-29 | End: 2020-02-19 | Stop reason: SDUPTHER

## 2019-05-10 ENCOUNTER — TELEPHONE (OUTPATIENT)
Dept: PRIMARY CARE CLINIC | Age: 54
End: 2019-05-10

## 2019-05-10 DIAGNOSIS — K21.9 GASTROESOPHAGEAL REFLUX DISEASE, ESOPHAGITIS PRESENCE NOT SPECIFIED: Primary | ICD-10-CM

## 2019-05-10 NOTE — TELEPHONE ENCOUNTER
PT. States that her heartburn is not getting better and she is requesting Endoscopy. PT. May be reached with a plan of care @ [phone].

## 2019-07-06 DIAGNOSIS — K21.9 GASTROESOPHAGEAL REFLUX DISEASE, ESOPHAGITIS PRESENCE NOT SPECIFIED: ICD-10-CM

## 2019-07-08 RX ORDER — RANITIDINE 150 MG/1
TABLET, FILM COATED ORAL
Qty: 90 TAB | Refills: 1 | Status: SHIPPED | OUTPATIENT
Start: 2019-07-08 | End: 2019-10-24

## 2019-08-27 ENCOUNTER — TELEPHONE (OUTPATIENT)
Dept: PRIMARY CARE CLINIC | Age: 54
End: 2019-08-27

## 2019-08-27 NOTE — TELEPHONE ENCOUNTER
Pt would like to know the process of getting breast reduction. Breasts are causing back and neck pain. States that recent bone density also shows osteoporosis. Considering having a breast reduction.

## 2019-08-27 NOTE — TELEPHONE ENCOUNTER
She needs to see a plastic surgeon for that. She can come to evaluate her back pain and referral if needed.

## 2019-08-29 ENCOUNTER — TELEPHONE (OUTPATIENT)
Dept: PRIMARY CARE CLINIC | Age: 54
End: 2019-08-29

## 2019-08-29 NOTE — TELEPHONE ENCOUNTER
Patient called regarding same matter that she spoke with someone about a few days ago would not disclose any additional info [phone]

## 2019-08-29 NOTE — TELEPHONE ENCOUNTER
Pt states that insurance gave her a list of plastic surgeons she can see and wanted to know if  knew anyone personally. Advised pt to look through the list and decide then let us know so that we can give her the referral. Pt verbalized her understanding.

## 2019-08-30 ENCOUNTER — TELEPHONE (OUTPATIENT)
Dept: PRIMARY CARE CLINIC | Age: 54
End: 2019-08-30

## 2019-08-30 NOTE — TELEPHONE ENCOUNTER
LVM informing pt that received her Dexa scan results. She needs an appt to discuss treatment options for osteoporosis.

## 2019-09-09 ENCOUNTER — OFFICE VISIT (OUTPATIENT)
Dept: PRIMARY CARE CLINIC | Age: 54
End: 2019-09-09

## 2019-09-09 VITALS
BODY MASS INDEX: 27 KG/M2 | DIASTOLIC BLOOD PRESSURE: 85 MMHG | TEMPERATURE: 98.5 F | WEIGHT: 143 LBS | HEART RATE: 64 BPM | RESPIRATION RATE: 16 BRPM | HEIGHT: 61 IN | OXYGEN SATURATION: 100 % | SYSTOLIC BLOOD PRESSURE: 136 MMHG

## 2019-09-09 DIAGNOSIS — M81.0 OSTEOPOROSIS, UNSPECIFIED OSTEOPOROSIS TYPE, UNSPECIFIED PATHOLOGICAL FRACTURE PRESENCE: Primary | ICD-10-CM

## 2019-09-09 DIAGNOSIS — N64.81 PTOSIS OF BREAST: ICD-10-CM

## 2019-09-09 DIAGNOSIS — R79.89 LOW VITAMIN D LEVEL: ICD-10-CM

## 2019-09-09 NOTE — PROGRESS NOTES
Identified pt with two pt identifiers(name and ). Reviewed record in preparation for visit and have obtained necessary documentation. Chief Complaint   Patient presents with    Osteoporosis        Health Maintenance Due   Topic    Hepatitis C Screening     DTaP/Tdap/Td series (1 - Tdap)    PAP AKA CERVICAL CYTOLOGY     Shingrix Vaccine Age 50> (1 of 2)    Bone Densitometry     BREAST CANCER SCRN MAMMOGRAM     Influenza Age 5 to Adult         Visit Vitals  /85 (BP 1 Location: Right arm, BP Patient Position: Sitting)   Pulse 64   Temp 98.5 °F (36.9 °C) (Oral)   Resp 16   Ht 5' 1\" (1.549 m)   Wt 143 lb (64.9 kg)   SpO2 100%   BMI 27.02 kg/m²     Pain Scale: /10    Coordination of Care Questionnaire:  :   1. Have you been to the ER, urgent care clinic since your last visit? Hospitalized since your last visit? No    2. Have you seen or consulted any other health care providers outside of the [address] [place] [address] since your last visit? Include any pap smears or colon screening.  No

## 2019-09-09 NOTE — PROGRESS NOTES
Subjective:     Chief Complaint   Patient presents with    Osteoporosis        She  is a 62y.o. year old female who presents today to discuss about DEXA scan result done on 7/23/2019 . DEXA scan is positive for osteoporosis. No fracture. Patient reports that she continue to have pain in her neck, back, shoulder. She received [place] back in [place] for the pain but nothing worked. She worry that her heavy breast is causing the symptoms. She made an appointment with Plastic surgeon tomorrow to discuss about this. Hx of low vit d. She was treated with 50,000 iu vit d I April, she is currently on Vit D 2,000 iu daily. Lab Results   Component Value Date/Time    VITAMIN D, 25-HYDROXY 18.0 (L) 04/15/2019 10:10 AM           Pertinent items are noted in HPI. Objective:     Vitals:    09/09/19 1529   BP: 136/85   Pulse: 64   Resp: 16   Temp: 98.5 °F (36.9 °C)   TempSrc: Oral   SpO2: 100%   Weight: 143 lb (64.9 kg)   Height: 5' 1\" (1.549 m)       Physical Examination: General appearance - alert, well appearing, and in no distress, oriented to person, place, and time and overweight  Mental status - alert, oriented to person, place, and time  Neck - supple, no significant adenopathy, Flexion,extension is slightly restricted with movement. She has indentation in her both side of the shoulder near bra line. Chest - clear to auscultation, no wheezes, rales or rhonchi, symmetric air entry  Heart - normal rate, regular rhythm, normal S1, S2, no murmurs, rubs, clicks or gallops    Allergies   Allergen Reactions    Advil [Ibuprofen] Swelling      Social History     Socioeconomic History    Marital status:      Spouse name: Not on file    Number of children: Not on file    Years of education: Not on file    Highest education level: Not on file   Tobacco Use    Smoking status: Former Smoker    Smokeless tobacco: Never Used   Substance and Sexual Activity    Alcohol use:  Yes    Drug use: No    Sexual activity: Yes     Partners: Male      No family history on file. Past Surgical History:   Procedure Laterality Date    HX COLONOSCOPY      internal hemroid 2009    HX HERNIA REPAIR  2004      Past Medical History:   Diagnosis Date    Hypercholesterolemia       Current Outpatient Medications   Medication Sig Dispense Refill    denosumab (PROLIA) 60 mg/mL injection 1 mL by SubCUTAneous route every 6 months. 1 mL 0    raNITIdine (ZANTAC) 150 mg tablet TAKE 1 TABLET BY MOUTH EVERY NIGHT 90 Tab 1    simvastatin (ZOCOR) 20 mg tablet TAKE 1 TABLET BY MOUTH EVERY NIGHT 30 Tab 5    ergocalciferol (ERGOCALCIFEROL) 50,000 unit capsule Take 1 Cap by mouth every seven (7) days. 8 Cap 0        Assessment/ Plan:   Diagnoses and all orders for this visit:    1. Osteoporosis, unspecified osteoporosis type, unspecified pathological fracture presence  -   Pt could not tolerate Fosamax due to GI side effects. denosumab (PROLIA) 60 mg/mL injection; 1 mL by SubCUTAneous route every 6 months. 2. Low vitamin D level  -     VITAMIN D, 25 HYDROXY               Will notify result and recommendation. 3. Ptosis of breast      Patient continue to have pain in her neck, back, shoulder. Source is likely the heavy breast.      She has an appointment with plastic surgeon tomorrow. Medication risks/benefits/costs/interactions/alternatives discussed with patient. Advised patient to call back or return to office if symptoms worsen/change/persist. If patient cannot reach us or should anything more severe/urgent arise he/she should proceed directly to the nearest emergency department. Discussed expected course/resolution/complications of diagnosis in detail with patient. Patient given a written after visit summary which includes her diagnoses, current medications and vitals. Patient expressed understanding with the diagnosis and plan.           Follow-up and Dispositions    · Return in about 3 months (around 12/9/2019), or if symptoms worsen or fail to improve, for complete physical  and fasting blood work., have fasting blood work done 2-3 days prior. Lord Rodriguez

## 2019-09-17 ENCOUNTER — TELEPHONE (OUTPATIENT)
Dept: PRIMARY CARE CLINIC | Age: 54
End: 2019-09-17

## 2019-09-17 NOTE — TELEPHONE ENCOUNTER
I spoke with patient over phone. She was giving me up dates on seeing the plastic surgeon for possible breast reduction.

## 2019-09-17 NOTE — TELEPHONE ENCOUNTER
[place] is going overseas and needs to have some questions answered prior to leaving pleas call patient back [phone]

## 2019-09-17 NOTE — TELEPHONE ENCOUNTER
Pt states that she is going out of the country today. She would like to  only about her chronic shoulder and neck pain.

## 2019-10-24 ENCOUNTER — HOSPITAL ENCOUNTER (OUTPATIENT)
Dept: PREADMISSION TESTING | Age: 54
Discharge: HOME | End: 2019-10-24
Payer: COMMERCIAL

## 2019-10-24 VITALS
DIASTOLIC BLOOD PRESSURE: 65 MMHG | TEMPERATURE: 97.6 F | WEIGHT: 146 LBS | HEIGHT: 61 IN | HEART RATE: 54 BPM | OXYGEN SATURATION: 98 % | BODY MASS INDEX: 27.56 KG/M2 | SYSTOLIC BLOOD PRESSURE: 134 MMHG | RESPIRATION RATE: 18 BRPM

## 2019-10-24 LAB
ALBUMIN SERPL-MCNC: 4.6 G/DL (ref 3.5–5)
ALBUMIN/GLOB SERPL: 1.2 {RATIO} (ref 1.1–2.2)
ALP SERPL-CCNC: 79 U/L (ref 45–117)
ALT SERPL-CCNC: 22 U/L (ref 12–78)
ANION GAP SERPL CALC-SCNC: 5 MMOL/L (ref 5–15)
APTT PPP: 29.8 SEC (ref 22.1–32)
AST SERPL-CCNC: 18 U/L (ref 15–37)
BASOPHILS # BLD: 0 K/UL (ref 0–0.1)
BASOPHILS NFR BLD: 0 % (ref 0–1)
BILIRUB SERPL-MCNC: 0.5 MG/DL (ref 0.2–1)
BUN SERPL-MCNC: 12 MG/DL (ref 6–20)
BUN/CREAT SERPL: 21 (ref 12–20)
CALCIUM SERPL-MCNC: 9.7 MG/DL (ref 8.5–10.1)
CHLORIDE SERPL-SCNC: 104 MMOL/L (ref 97–108)
CO2 SERPL-SCNC: 29 MMOL/L (ref 21–32)
CREAT SERPL-MCNC: 0.58 MG/DL (ref 0.55–1.02)
DIFFERENTIAL METHOD BLD: NORMAL
EOSINOPHIL # BLD: 0.1 K/UL (ref 0–0.4)
EOSINOPHIL NFR BLD: 1 % (ref 0–7)
ERYTHROCYTE [DISTWIDTH] IN BLOOD BY AUTOMATED COUNT: 13.2 % (ref 11.5–14.5)
GLOBULIN SER CALC-MCNC: 3.9 G/DL (ref 2–4)
GLUCOSE SERPL-MCNC: 93 MG/DL (ref 65–100)
HCT VFR BLD AUTO: 41.5 % (ref 35–47)
HGB BLD-MCNC: 13.4 G/DL (ref 11.5–16)
IMM GRANULOCYTES # BLD AUTO: 0 K/UL (ref 0–0.04)
IMM GRANULOCYTES NFR BLD AUTO: 0 % (ref 0–0.5)
INR PPP: 1 (ref 0.9–1.1)
LYMPHOCYTES # BLD: 2.3 K/UL (ref 0.8–3.5)
LYMPHOCYTES NFR BLD: 36 % (ref 12–49)
MCH RBC QN AUTO: 29.9 PG (ref 26–34)
MCHC RBC AUTO-ENTMCNC: 32.3 G/DL (ref 30–36.5)
MCV RBC AUTO: 92.6 FL (ref 80–99)
MONOCYTES # BLD: 0.4 K/UL (ref 0–1)
MONOCYTES NFR BLD: 6 % (ref 5–13)
NEUTS SEG # BLD: 3.7 K/UL (ref 1.8–8)
NEUTS SEG NFR BLD: 57 % (ref 32–75)
NRBC # BLD: 0 K/UL (ref 0–0.01)
NRBC BLD-RTO: 0 PER 100 WBC
PLATELET # BLD AUTO: 208 K/UL (ref 150–400)
PMV BLD AUTO: 12.5 FL (ref 8.9–12.9)
POTASSIUM SERPL-SCNC: 5.2 MMOL/L (ref 3.5–5.1)
PROT SERPL-MCNC: 8.5 G/DL (ref 6.4–8.2)
PROTHROMBIN TIME: 10.3 SEC (ref 9–11.1)
RBC # BLD AUTO: 4.48 M/UL (ref 3.8–5.2)
SODIUM SERPL-SCNC: 138 MMOL/L (ref 136–145)
THERAPEUTIC RANGE,PTTT: NORMAL SECS (ref 58–77)
WBC # BLD AUTO: 6.5 K/UL (ref 3.6–11)

## 2019-10-24 PROCEDURE — 85610 PROTHROMBIN TIME: CPT

## 2019-10-24 PROCEDURE — 80053 COMPREHEN METABOLIC PANEL: CPT

## 2019-10-24 PROCEDURE — 36415 COLL VENOUS BLD VENIPUNCTURE: CPT

## 2019-10-24 PROCEDURE — 85025 COMPLETE CBC W/AUTO DIFF WBC: CPT

## 2019-10-24 PROCEDURE — 85730 THROMBOPLASTIN TIME PARTIAL: CPT

## 2019-10-24 PROCEDURE — 93005 ELECTROCARDIOGRAM TRACING: CPT

## 2019-10-24 RX ORDER — CHOLECALCIFEROL (VITAMIN D3) 125 MCG
15 CAPSULE ORAL
COMMUNITY
End: 2020-02-19 | Stop reason: ALTCHOICE

## 2019-10-24 RX ORDER — PANTOPRAZOLE SODIUM 40 MG/1
40 TABLET, DELAYED RELEASE ORAL
COMMUNITY
End: 2022-01-14 | Stop reason: SDUPTHER

## 2019-10-24 NOTE — PERIOP NOTES
[address], [address] [facility]                            MAIN OR                                  [phone]   MAIN PRE OP                          [phone]                                                                                AMBULATORY PRE OP          [phone]  PRE-ADMISSION TESTING    [phone]     Surgery Date:   Monday, October 28       Is surgery arrival time given by surgeon? NO  If [facility] staff will call you Friday, October 25 between 3 and 7pm the day before your surgery with your arrival time. (If your surgery is on a Monday, we will call you the Friday before.)    Call [phone] after 7pm Monday-Friday if you did not receive your arrival time. INSTRUCTIONS BEFORE YOUR SURGERY   When You  Arrive   Arrive at the [address] [facility] on the day of your surgery  Have your insurance card, photo ID, and any copayment (if needed)     Food   and   Drink   NO food or drink after midnight the night before surgery    This means NO water, gum, mints, coffee, juice, etc.  No alcohol (beer, wine, liquor) 24 hours before and after surgery     Medications to   TAKE   Morning of Surgery   MEDICATIONS TO TAKE THE MORNING OF SURGERY WITH A SIP OF WATER:          PROTONIX,    Medications  To  STOP      7 days before surgery    Non-Steroidal anti-inflammatory Drugs (NSAID's): for example, Ibuprofen (Advil, Motrin), Naproxen (Aleve)   Aspirin, if taking for pain    Herbal supplements, vitamins, and fish oil   Other:  (Pain medications not listed above, including Tylenol may be taken)   Blood  Thinners    If you take  Aspirin, Plavix, Coumadin, or any blood-thinning or anti-blood clot medicine, talk to the doctor who prescribed the medications for pre-operative instructions.      Bathing Clothing  Jewelry  Valuables       If you shower the morning of surgery, please do not apply anything to your skin (lotions, powders, deodorant, or makeup, especially mascara)   Follow all special bath instructions (for total joint replacement, spine and bowel surgeries)   Do not shave or trim anywhere 24 hours before surgery   Wear your hair loose or down; no pony-tails, buns, or metal hair clips   Wear loose, comfortable, clean clothes   Wear glasses instead of contacts  [employer] money, valuables, and jewelry, including body piercings, at home     Going Home       or Spending the Night       ADMITS: If your doctor is keeping you into the hospital after surgery, leave personal belongings/luggage in your car until you have a hospital room number. Hospital discharge time is 12 noon  Drivers must be here before 12 noon unless you are told differently   Special Instructions Chlorhexadine Kit  Free  Parking         Follow all instructions so your surgery wont be cancelled. Please, be on time. If a situation occurs and you are delayed the day of surgery, call [phone] or          [phone]. If your physical condition changes (like a fever, cold, flu, etc.) call your surgeon. The patient was contacted  in person. Home medication reviewed and verified during PAT appointment. The patient verbalizes understanding of all instructions and does not  need reinforcement.

## 2019-10-25 ENCOUNTER — ANESTHESIA EVENT (OUTPATIENT)
Dept: SURGERY | Age: 54
DRG: 578 | End: 2019-10-25
Payer: COMMERCIAL

## 2019-10-25 LAB
ATRIAL RATE: 54 BPM
CALCULATED P AXIS, ECG09: 45 DEGREES
CALCULATED R AXIS, ECG10: 9 DEGREES
CALCULATED T AXIS, ECG11: 38 DEGREES
DIAGNOSIS, 93000: NORMAL
P-R INTERVAL, ECG05: 156 MS
Q-T INTERVAL, ECG07: 468 MS
QRS DURATION, ECG06: 74 MS
QTC CALCULATION (BEZET), ECG08: 443 MS
VENTRICULAR RATE, ECG03: 54 BPM

## 2019-10-28 ENCOUNTER — HOSPITAL ENCOUNTER (INPATIENT)
Age: 54
LOS: 2 days | Discharge: HOME | DRG: 578 | End: 2019-10-30
Attending: SURGERY | Admitting: SURGERY
Payer: COMMERCIAL

## 2019-10-28 ENCOUNTER — ANESTHESIA (OUTPATIENT)
Dept: SURGERY | Age: 54
DRG: 578 | End: 2019-10-28
Payer: COMMERCIAL

## 2019-10-28 PROBLEM — N62 MACROMASTIA: Status: ACTIVE | Noted: 2019-10-28

## 2019-10-28 PROCEDURE — 77030011825 HC SUPP SURG PSTOP S2SG -B: Performed by: SURGERY

## 2019-10-28 PROCEDURE — 99218 HC RM OBSERVATION: CPT

## 2019-10-28 PROCEDURE — 77030018836 HC SOL IRR NACL ICUM -A: Performed by: SURGERY

## 2019-10-28 PROCEDURE — 74011250637 HC RX REV CODE- 250/637: Performed by: SURGERY

## 2019-10-28 PROCEDURE — 77030011640 HC PAD GRND REM COVD -A: Performed by: SURGERY

## 2019-10-28 PROCEDURE — 77030010507 HC ADH SKN DERMBND J&J -B: Performed by: SURGERY

## 2019-10-28 PROCEDURE — 76210000035 HC AMBSU PH I REC 1 TO 1.5 HR: Performed by: SURGERY

## 2019-10-28 PROCEDURE — 65270000029 HC RM PRIVATE

## 2019-10-28 PROCEDURE — 77030040361 HC SLV COMPR DVT MDII -B

## 2019-10-28 PROCEDURE — 77030020255 HC SOL INJ LR 1000ML BG: Performed by: SURGERY

## 2019-10-28 PROCEDURE — 77030002986 HC SUT PROL J&J -A: Performed by: SURGERY

## 2019-10-28 PROCEDURE — 77030008463 HC STPLR SKN PROX J&J -B: Performed by: SURGERY

## 2019-10-28 PROCEDURE — 76060000064 HC AMB SURG ANES 2 TO 2.5 HR: Performed by: SURGERY

## 2019-10-28 PROCEDURE — 77030031139 HC SUT VCRL2 J&J -A: Performed by: SURGERY

## 2019-10-28 PROCEDURE — 76030000004 HC AMB SURG OR TIME 2 TO 2.5: Performed by: SURGERY

## 2019-10-28 PROCEDURE — 74011000250 HC RX REV CODE- 250: Performed by: ANESTHESIOLOGY

## 2019-10-28 PROCEDURE — 77030008534 HC TBNG LIPOSUC BYRO -B: Performed by: SURGERY

## 2019-10-28 PROCEDURE — 74011250637 HC RX REV CODE- 250/637: Performed by: PHYSICIAN ASSISTANT

## 2019-10-28 PROCEDURE — 74011250636 HC RX REV CODE- 250/636: Performed by: ANESTHESIOLOGY

## 2019-10-28 PROCEDURE — 77030020262 HC SOL INJ SOD CL 0.9% 100ML: Performed by: SURGERY

## 2019-10-28 PROCEDURE — 77030008684 HC TU ET CUF COVD -B: Performed by: ANESTHESIOLOGY

## 2019-10-28 PROCEDURE — 74011000250 HC RX REV CODE- 250: Performed by: PHYSICIAN ASSISTANT

## 2019-10-28 PROCEDURE — 77030040506 HC DRN WND MDII -A: Performed by: SURGERY

## 2019-10-28 PROCEDURE — 74011000250 HC RX REV CODE- 250: Performed by: SURGERY

## 2019-10-28 PROCEDURE — 77030019940 HC BLNKT HYPOTHRM STRY -B: Performed by: SURGERY

## 2019-10-28 PROCEDURE — 77030028700 HC BLD TISS PLSM MEDT -E: Performed by: SURGERY

## 2019-10-28 PROCEDURE — 88305 TISSUE EXAM BY PATHOLOGIST: CPT

## 2019-10-28 PROCEDURE — 74011250636 HC RX REV CODE- 250/636: Performed by: PHYSICIAN ASSISTANT

## 2019-10-28 PROCEDURE — 77030002933 HC SUT MCRYL J&J -A: Performed by: SURGERY

## 2019-10-28 PROCEDURE — 77030002916 HC SUT ETHLN J&J -A: Performed by: SURGERY

## 2019-10-28 PROCEDURE — 51798 US URINE CAPACITY MEASURE: CPT

## 2019-10-28 RX ORDER — HYDROMORPHONE HYDROCHLORIDE 1 MG/ML
.25-1 INJECTION, SOLUTION INTRAMUSCULAR; INTRAVENOUS; SUBCUTANEOUS
Status: DISCONTINUED | OUTPATIENT
Start: 2019-10-28 | End: 2019-10-28 | Stop reason: HOSPADM

## 2019-10-28 RX ORDER — DIPHENHYDRAMINE HCL 25 MG
25 CAPSULE ORAL
Status: DISCONTINUED | OUTPATIENT
Start: 2019-10-28 | End: 2019-10-30 | Stop reason: HOSPADM

## 2019-10-28 RX ORDER — SIMVASTATIN 20 MG/1
20 TABLET, FILM COATED ORAL DAILY
Status: DISCONTINUED | OUTPATIENT
Start: 2019-10-29 | End: 2019-10-30 | Stop reason: HOSPADM

## 2019-10-28 RX ORDER — SODIUM CHLORIDE, SODIUM LACTATE, POTASSIUM CHLORIDE, CALCIUM CHLORIDE 600; 310; 30; 20 MG/100ML; MG/100ML; MG/100ML; MG/100ML
INJECTION, SOLUTION INTRAVENOUS
Status: DISCONTINUED | OUTPATIENT
Start: 2019-10-28 | End: 2019-10-28 | Stop reason: HOSPADM

## 2019-10-28 RX ORDER — MORPHINE SULFATE 2 MG/ML
2 INJECTION, SOLUTION INTRAMUSCULAR; INTRAVENOUS
Status: DISCONTINUED | OUTPATIENT
Start: 2019-10-28 | End: 2019-10-30 | Stop reason: HOSPADM

## 2019-10-28 RX ORDER — PANTOPRAZOLE SODIUM 40 MG/1
40 TABLET, DELAYED RELEASE ORAL
Status: DISCONTINUED | OUTPATIENT
Start: 2019-10-28 | End: 2019-10-30 | Stop reason: HOSPADM

## 2019-10-28 RX ORDER — MIDAZOLAM HYDROCHLORIDE 1 MG/ML
INJECTION, SOLUTION INTRAMUSCULAR; INTRAVENOUS AS NEEDED
Status: DISCONTINUED | OUTPATIENT
Start: 2019-10-28 | End: 2019-10-28 | Stop reason: HOSPADM

## 2019-10-28 RX ORDER — DIPHENHYDRAMINE HYDROCHLORIDE 50 MG/ML
12.5 INJECTION, SOLUTION INTRAMUSCULAR; INTRAVENOUS AS NEEDED
Status: DISCONTINUED | OUTPATIENT
Start: 2019-10-28 | End: 2019-10-28 | Stop reason: HOSPADM

## 2019-10-28 RX ORDER — DIAZEPAM 5 MG/1
5 TABLET ORAL
Status: DISCONTINUED | OUTPATIENT
Start: 2019-10-28 | End: 2019-10-30 | Stop reason: HOSPADM

## 2019-10-28 RX ORDER — NALOXONE HYDROCHLORIDE 0.4 MG/ML
0.4 INJECTION, SOLUTION INTRAMUSCULAR; INTRAVENOUS; SUBCUTANEOUS AS NEEDED
Status: DISCONTINUED | OUTPATIENT
Start: 2019-10-28 | End: 2019-10-30 | Stop reason: HOSPADM

## 2019-10-28 RX ORDER — ONDANSETRON 4 MG/1
4 TABLET, ORALLY DISINTEGRATING ORAL
Status: DISCONTINUED | OUTPATIENT
Start: 2019-10-28 | End: 2019-10-30 | Stop reason: HOSPADM

## 2019-10-28 RX ORDER — LIDOCAINE HYDROCHLORIDE 10 MG/ML
INJECTION INFILTRATION; PERINEURAL AS NEEDED
Status: DISCONTINUED | OUTPATIENT
Start: 2019-10-28 | End: 2019-10-28 | Stop reason: HOSPADM

## 2019-10-28 RX ORDER — DEXAMETHASONE SODIUM PHOSPHATE 4 MG/ML
INJECTION, SOLUTION INTRA-ARTICULAR; INTRALESIONAL; INTRAMUSCULAR; INTRAVENOUS; SOFT TISSUE AS NEEDED
Status: DISCONTINUED | OUTPATIENT
Start: 2019-10-28 | End: 2019-10-28 | Stop reason: HOSPADM

## 2019-10-28 RX ORDER — LIDOCAINE HYDROCHLORIDE 10 MG/ML
0.1 INJECTION, SOLUTION EPIDURAL; INFILTRATION; INTRACAUDAL; PERINEURAL AS NEEDED
Status: DISCONTINUED | OUTPATIENT
Start: 2019-10-28 | End: 2019-10-28 | Stop reason: HOSPADM

## 2019-10-28 RX ORDER — PROPOFOL 10 MG/ML
INJECTION, EMULSION INTRAVENOUS AS NEEDED
Status: DISCONTINUED | OUTPATIENT
Start: 2019-10-28 | End: 2019-10-28 | Stop reason: HOSPADM

## 2019-10-28 RX ORDER — NALOXONE HYDROCHLORIDE 0.4 MG/ML
0.2 INJECTION, SOLUTION INTRAMUSCULAR; INTRAVENOUS; SUBCUTANEOUS
Status: DISCONTINUED | OUTPATIENT
Start: 2019-10-28 | End: 2019-10-28 | Stop reason: HOSPADM

## 2019-10-28 RX ORDER — ONDANSETRON 2 MG/ML
INJECTION INTRAMUSCULAR; INTRAVENOUS AS NEEDED
Status: DISCONTINUED | OUTPATIENT
Start: 2019-10-28 | End: 2019-10-28 | Stop reason: HOSPADM

## 2019-10-28 RX ORDER — CEFAZOLIN SODIUM 1 G/3ML
INJECTION, POWDER, FOR SOLUTION INTRAMUSCULAR; INTRAVENOUS AS NEEDED
Status: DISCONTINUED | OUTPATIENT
Start: 2019-10-28 | End: 2019-10-28 | Stop reason: HOSPADM

## 2019-10-28 RX ORDER — CALCIUM CARBONATE 200(500)MG
200 TABLET,CHEWABLE ORAL
Status: DISCONTINUED | OUTPATIENT
Start: 2019-10-28 | End: 2019-10-30 | Stop reason: HOSPADM

## 2019-10-28 RX ORDER — SODIUM CHLORIDE, SODIUM LACTATE, POTASSIUM CHLORIDE, CALCIUM CHLORIDE 600; 310; 30; 20 MG/100ML; MG/100ML; MG/100ML; MG/100ML
125 INJECTION, SOLUTION INTRAVENOUS CONTINUOUS
Status: DISCONTINUED | OUTPATIENT
Start: 2019-10-28 | End: 2019-10-28 | Stop reason: HOSPADM

## 2019-10-28 RX ORDER — FENTANYL CITRATE 50 UG/ML
INJECTION, SOLUTION INTRAMUSCULAR; INTRAVENOUS AS NEEDED
Status: DISCONTINUED | OUTPATIENT
Start: 2019-10-28 | End: 2019-10-28 | Stop reason: HOSPADM

## 2019-10-28 RX ORDER — ENOXAPARIN SODIUM 100 MG/ML
40 INJECTION SUBCUTANEOUS EVERY 24 HOURS
Status: DISCONTINUED | OUTPATIENT
Start: 2019-10-29 | End: 2019-10-30 | Stop reason: HOSPADM

## 2019-10-28 RX ORDER — SODIUM CHLORIDE 0.9 % (FLUSH) 0.9 %
5-40 SYRINGE (ML) INJECTION EVERY 8 HOURS
Status: DISCONTINUED | OUTPATIENT
Start: 2019-10-28 | End: 2019-10-30 | Stop reason: HOSPADM

## 2019-10-28 RX ORDER — HYDROMORPHONE HCL/0.9% NACL/PF 0.5 MG/ML
PLASTIC BAG, INJECTION (ML) INTRAVENOUS CONTINUOUS
Status: DISCONTINUED | OUTPATIENT
Start: 2019-10-28 | End: 2019-10-29

## 2019-10-28 RX ORDER — FLUMAZENIL 0.1 MG/ML
0.2 INJECTION INTRAVENOUS
Status: DISCONTINUED | OUTPATIENT
Start: 2019-10-28 | End: 2019-10-28 | Stop reason: HOSPADM

## 2019-10-28 RX ORDER — SODIUM CHLORIDE 9 MG/ML
75 INJECTION, SOLUTION INTRAVENOUS CONTINUOUS
Status: DISCONTINUED | OUTPATIENT
Start: 2019-10-28 | End: 2019-10-29

## 2019-10-28 RX ORDER — ACETAMINOPHEN 325 MG/1
650 TABLET ORAL
Status: DISCONTINUED | OUTPATIENT
Start: 2019-10-28 | End: 2019-10-30 | Stop reason: HOSPADM

## 2019-10-28 RX ORDER — PROCHLORPERAZINE EDISYLATE 5 MG/ML
10 INJECTION INTRAMUSCULAR; INTRAVENOUS
Status: DISCONTINUED | OUTPATIENT
Start: 2019-10-28 | End: 2019-10-30 | Stop reason: HOSPADM

## 2019-10-28 RX ORDER — SODIUM CHLORIDE 0.9 % (FLUSH) 0.9 %
5-40 SYRINGE (ML) INJECTION AS NEEDED
Status: DISCONTINUED | OUTPATIENT
Start: 2019-10-28 | End: 2019-10-30 | Stop reason: HOSPADM

## 2019-10-28 RX ORDER — ROCURONIUM BROMIDE 10 MG/ML
INJECTION, SOLUTION INTRAVENOUS AS NEEDED
Status: DISCONTINUED | OUTPATIENT
Start: 2019-10-28 | End: 2019-10-28 | Stop reason: HOSPADM

## 2019-10-28 RX ORDER — DOCUSATE SODIUM 100 MG/1
100 CAPSULE, LIQUID FILLED ORAL 2 TIMES DAILY
Status: DISCONTINUED | OUTPATIENT
Start: 2019-10-28 | End: 2019-10-30 | Stop reason: HOSPADM

## 2019-10-28 RX ADMIN — FENTANYL CITRATE 50 MCG: 50 INJECTION INTRAMUSCULAR; INTRAVENOUS at 14:37

## 2019-10-28 RX ADMIN — ROCURONIUM BROMIDE 40 MG: 50 INJECTION, SOLUTION INTRAVENOUS at 12:45

## 2019-10-28 RX ADMIN — FENTANYL CITRATE 50 MCG: 50 INJECTION INTRAMUSCULAR; INTRAVENOUS at 13:16

## 2019-10-28 RX ADMIN — Medication 10 ML: at 17:03

## 2019-10-28 RX ADMIN — SODIUM CHLORIDE, POTASSIUM CHLORIDE, SODIUM LACTATE AND CALCIUM CHLORIDE: 600; 310; 30; 20 INJECTION, SOLUTION INTRAVENOUS at 12:38

## 2019-10-28 RX ADMIN — DOCUSATE SODIUM 100 MG: 100 CAPSULE, LIQUID FILLED ORAL at 17:43

## 2019-10-28 RX ADMIN — Medication: at 15:09

## 2019-10-28 RX ADMIN — SODIUM CHLORIDE 75 ML/HR: 900 INJECTION, SOLUTION INTRAVENOUS at 17:01

## 2019-10-28 RX ADMIN — PROPOFOL 150 MG: 10 INJECTION, EMULSION INTRAVENOUS at 12:45

## 2019-10-28 RX ADMIN — HYDROMORPHONE HYDROCHLORIDE 0.5 MG: 1 INJECTION, SOLUTION INTRAMUSCULAR; INTRAVENOUS; SUBCUTANEOUS at 15:26

## 2019-10-28 RX ADMIN — PANTOPRAZOLE SODIUM 40 MG: 40 TABLET, DELAYED RELEASE ORAL at 17:43

## 2019-10-28 RX ADMIN — DEXAMETHASONE SODIUM PHOSPHATE 4 MG: 4 INJECTION, SOLUTION INTRAMUSCULAR; INTRAVENOUS at 13:50

## 2019-10-28 RX ADMIN — FENTANYL CITRATE 100 MCG: 50 INJECTION INTRAMUSCULAR; INTRAVENOUS at 12:45

## 2019-10-28 RX ADMIN — ONDANSETRON 4 MG: 2 INJECTION INTRAMUSCULAR; INTRAVENOUS at 13:50

## 2019-10-28 RX ADMIN — Medication 10 ML: at 20:57

## 2019-10-28 RX ADMIN — ANTACID TABLETS 200 MG: 500 TABLET, CHEWABLE ORAL at 20:57

## 2019-10-28 RX ADMIN — MIDAZOLAM 2 MG: 1 INJECTION INTRAMUSCULAR; INTRAVENOUS at 12:38

## 2019-10-28 RX ADMIN — WATER 2 G: 1 INJECTION INTRAMUSCULAR; INTRAVENOUS; SUBCUTANEOUS at 20:57

## 2019-10-28 RX ADMIN — CEFAZOLIN 2 G: 1 INJECTION, POWDER, FOR SOLUTION INTRAMUSCULAR; INTRAVENOUS at 12:52

## 2019-10-28 NOTE — DISCHARGE INSTRUCTIONS
Breast Reduction Patient Instructions    Immediately Following Surgery:    After surgery you will rest quietly for the first 48 hours. You will be able to walk around the house and perform light daily activities; however, during this time, it is not at all unusual for you to feel some pain, soreness and pressure in the chest area. This will gradually subside and Dr. Lamar Alston will give you pain medication to relieve it. You must take the entire prescription of antibiotics. Be sure to lie on your back whenever you rest or sleep. Keep your head elevated for 72 hours after surgery as this will help with swelling. The surgery bra that you have been put in should be worn constantly during the day and at night. You may also wear a soft sports bra with light support instead of the surgery bra if preferred. You will then be allowed to shower two days after surgery. Samuel Benders yourself everywhere, including the tapes and your incisions. Use mild soap and pat yourself dry and put the bra back on. This daily routine will help keep the incisions clean, and will promote wound healing. Do not submerge yourself in a bath, swimming pool, or whirlpool for two weeks. You will wear the sports bra for a total of two to three weeks after surgery. The small tape strips covering your incisions. These will remain in place for seven days and will peel off by themselves. A few patients react to the anesthetic after surgery with nausea and vomiting. This usually lasts less than 24 hours and should be treated with lots of fluids. Dr. Lamar Alston will prescribe nausea medicine for during your preoperative visit. The maximum swelling occurs at about three days and then begins to dramatically improve. Mild bruising typically resolves within 14 days. You should plan to be off work for 1-2 weeks, although this can vary from person to person.     Additional Post-Operative Instructions:    No heavy exercise or lifting for three weeks following surgery. For the first three days following surgery you should try to restrict your arm movements. Move your arms slowly and avoid sudden jerky movements of the chest and breast area. Keep your arms as close to your body as possible. Dr. Roni Guerra encourages walking immediately after surgery. This activity will greatly minimize the risk of blood clots in your leg veins. Do not expose your breasts to the sun for eight weeks after surgery. Please notify Dr. Roni Guerra if:   If your one breast becomes significantly larger than the other   If you develop significant bruising across the chest    If you experience a significant increase in pain in the breast after the pain has improved   If you develop a temperature above 101.5° F   If you develop redness (like a sunburn) around your incisions  If you need help or have any questions feel free to call Dr Roni Guerra with your concerns. Dr. Roni Guerra is on call 24 hours per day, seven days per week and has an answering service to forward calls. The quality of your cosmetic enhancement may be compromised if you fail to return for any scheduled post-op visits, or follow the pre- and post-operative instructions. Please dont hesitate to report any unusual or concerning changes. Our number is [phone].

## 2019-10-28 NOTE — BRIEF OP NOTE
BRIEF OPERATIVE NOTE    Date of Procedure: 10/28/2019   Preoperative Diagnosis: MACROMASTIA  Postoperative Diagnosis: MACROMASTIA    Procedure(s):  BILATERAL BREAST REDUCTION  Surgeon(s) and Role:     * Karen Butler MD - Primary         Surgical Assistant: Joshua polanco    Surgical Staff:  Circ-1: Adair Rodriguez  Physician Assistant: Allison Mcbride PA-C  Scrub Tech-1: Tamica Echeverria  Event Time In Time Out   Incision Start 1309    Incision Close       Anesthesia: General   Estimated Blood Loss: 50  Specimens:   ID Type Source Tests Collected by Time Destination   1 :  RIGHT BREAST TISSUE Fresh Breast  Karen Butler MD 10/28/2019 1340 Pathology   2 : LEFT BREAST TISSUE Fresh Breast  Karen Butler MD 10/28/2019 1340 Pathology      Findings: 0   Complications: 0  Implants: * No implants in log *

## 2019-10-28 NOTE — ANESTHESIA PREPROCEDURE EVALUATION
Anesthetic History          Comments: Urinary retention     Review of Systems / Medical History  Patient summary reviewed and pertinent labs reviewed    Pulmonary  Within defined limits                 Neuro/Psych   Within defined limits           Cardiovascular  Within defined limits                     GI/Hepatic/Renal     GERD: well controlled           Endo/Other  Within defined limits           Other Findings            Physical Exam    Airway  Mallampati: II    Neck ROM: normal range of motion   Mouth opening: Normal     Cardiovascular    Rhythm: regular  Rate: normal         Dental  No notable dental hx       Pulmonary  Breath sounds clear to auscultation               Abdominal  GI exam deferred       Other Findings            Anesthetic Plan    ASA: 1  Anesthesia type: general          Induction: Intravenous  Anesthetic plan and risks discussed with: Patient

## 2019-10-28 NOTE — PROGRESS NOTES
Problem: Falls - Risk of  Goal: *Absence of Falls  Description  Document Luis E Wilcox Fall Risk and appropriate interventions in the flowsheet. Outcome: Progressing Towards Goal  Note:   Fall Risk Interventions:  Mobility Interventions: Communicate number of staff needed for ambulation/transfer, Patient to call before getting OOB         Medication Interventions: Assess postural VS orthostatic hypotension, Patient to call before getting OOB, Teach patient to arise slowly    Elimination Interventions: Call light in reach, Patient to call for help with toileting needs, Stay With Me (per policy), Toileting schedule/hourly rounds              Problem: Patient Education: Go to Patient Education Activity  Goal: Patient/Family Education  Outcome: Progressing Towards Goal     Problem: Pain  Goal: *Control of Pain  Outcome: Progressing Towards Goal     Problem: Patient Education: Go to Patient Education Activity  Goal: Patient/Family Education  Outcome: Progressing Towards Goal     Problem: Surgical Pathway Day of Surgery  Goal: Activity/Safety  Outcome: Progressing Towards Goal  Goal: Consults, if ordered  Outcome: Progressing Towards Goal  Goal: Nutrition/Diet  Outcome: Progressing Towards Goal  Goal: Medications  Outcome: Progressing Towards Goal  Goal: Respiratory  Outcome: Progressing Towards Goal  Goal: Treatments/Interventions/Procedures  Outcome: Progressing Towards Goal  Goal: Psychosocial  Outcome: Progressing Towards Goal  Goal: *No signs and symptoms of infection or wound complications  Outcome: Progressing Towards Goal  Goal: *Optimal pain control at patient's stated goal  Outcome: Progressing Towards Goal  Goal: *Adequate urinary output (equal to or greater than 30 milliliters/hour)  Description  Ambulatory Surgery patients voiding without difficulty.   Outcome: Progressing Towards Goal  Goal: *Hemodynamically stable  Outcome: Progressing Towards Goal  Goal: *Tolerating diet  Outcome: Progressing Towards Goal  Goal: *Demonstrates progressive activity  Outcome: Progressing Towards Goal

## 2019-10-28 NOTE — H&P
History and Physical    Patient is a 62 y.o. well-developed, well nourished female who presents for bilateral breast reduction. Past Medical History:   Diagnosis Date    Adverse effect of anesthesia     Urinary retention    GERD (gastroesophageal reflux disease)     Hypercholesteremia     Hypercholesterolemia     Osteoporosis      Past Surgical History:   Procedure Laterality Date    HX COLONOSCOPY      internal hemroid 2009    HX HEMORRHOIDECTOMY  2009    HX HERNIA REPAIR  2004     Prior to Admission medications    Medication Sig Start Date End Date Taking? Authorizing Provider   pantoprazole (PROTONIX) 40 mg tablet Take 40 mg by mouth daily as needed. Provider, Historical   docosahexanoic acid/epa (FISH OIL PO) Take 300 mg by mouth daily. Provider, Historical   calcium-cholecalciferol, d3, (CALCIUM 600 + D) 600-125 mg-unit tab Take 1 Tab by mouth daily. Provider, Historical   melatonin 5 mg tablet Take 15 mg by mouth nightly. Provider, Historical   denosumab (PROLIA) 60 mg/mL injection 1 mL by SubCUTAneous route every 6 months. 9/9/19   Yobany Wall MD   simvastatin (ZOCOR) 20 mg tablet TAKE 1 TABLET BY MOUTH EVERY NIGHT 4/29/19   Yobany Wall MD   ergocalciferol (ERGOCALCIFEROL) 50,000 unit capsule Take 1 Cap by mouth every seven (7) days. 4/16/19   Yobany Wall MD     Allergies   Allergen Reactions    Advil [Ibuprofen] Swelling     Lips only. No resp sxs       General:   No apparent distress. Heart:  Regular rate and rhythm without murmurs or rubs. Lungs:  Clear to ausculation bilaterally; no wheezes, rales or rhonchi. Patient is ready to go to surgery.     Jefry Olsen MD  10/28/2019

## 2019-10-28 NOTE — PERIOP NOTES
TRANSFER - OUT REPORT:    Verbal report given to Baudilio Santana on Brian Neamin-Maskal  being transferred to [address] [place] [address]   Report consisted of patients Situation, Background, Assessment and   Recommendations(SBAR). Information from the following report(s)  was reviewed with the receiving nurse. Lines:   Peripheral IV 10/28/19 Right Arm (Active)   Site Assessment Clean, dry, & intact 10/28/2019  2:41 PM   Phlebitis Assessment 0 10/28/2019  2:41 PM   Infiltration Assessment 0 10/28/2019 11:58 AM   Dressing Status Clean, dry, & intact 10/28/2019 11:58 AM   Dressing Type Transparent 10/28/2019 11:58 AM   Hub Color/Line Status Pink 10/28/2019  2:41 PM        Opportunity for questions and clarification was provided.       Patient transported with:   Registered Nurse

## 2019-10-29 PROCEDURE — 65270000029 HC RM PRIVATE

## 2019-10-29 PROCEDURE — 74011000250 HC RX REV CODE- 250: Performed by: PHYSICIAN ASSISTANT

## 2019-10-29 PROCEDURE — 74011250637 HC RX REV CODE- 250/637: Performed by: PHYSICIAN ASSISTANT

## 2019-10-29 PROCEDURE — 94760 N-INVAS EAR/PLS OXIMETRY 1: CPT

## 2019-10-29 PROCEDURE — 77030005513 HC CATH URETH FOL11 MDII -B

## 2019-10-29 PROCEDURE — 74011250636 HC RX REV CODE- 250/636: Performed by: PHYSICIAN ASSISTANT

## 2019-10-29 PROCEDURE — 51798 US URINE CAPACITY MEASURE: CPT

## 2019-10-29 PROCEDURE — 99218 HC RM OBSERVATION: CPT

## 2019-10-29 RX ORDER — HYDROMORPHONE HYDROCHLORIDE 2 MG/1
2 TABLET ORAL
Status: DISCONTINUED | OUTPATIENT
Start: 2019-10-29 | End: 2019-10-30 | Stop reason: HOSPADM

## 2019-10-29 RX ORDER — HYDROMORPHONE HYDROCHLORIDE 2 MG/1
4 TABLET ORAL
Status: DISCONTINUED | OUTPATIENT
Start: 2019-10-29 | End: 2019-10-30 | Stop reason: HOSPADM

## 2019-10-29 RX ADMIN — WATER 2 G: 1 INJECTION INTRAMUSCULAR; INTRAVENOUS; SUBCUTANEOUS at 21:06

## 2019-10-29 RX ADMIN — Medication 10 ML: at 07:27

## 2019-10-29 RX ADMIN — HYDROMORPHONE HYDROCHLORIDE 4 MG: 2 TABLET ORAL at 16:41

## 2019-10-29 RX ADMIN — Medication: at 07:28

## 2019-10-29 RX ADMIN — SODIUM CHLORIDE 75 ML/HR: 900 INJECTION, SOLUTION INTRAVENOUS at 05:17

## 2019-10-29 RX ADMIN — DIAZEPAM 5 MG: 5 TABLET ORAL at 18:43

## 2019-10-29 RX ADMIN — HYDROMORPHONE HYDROCHLORIDE 4 MG: 2 TABLET ORAL at 21:05

## 2019-10-29 RX ADMIN — HYDROMORPHONE HYDROCHLORIDE 2 MG: 2 TABLET ORAL at 08:58

## 2019-10-29 RX ADMIN — WATER 2 G: 1 INJECTION INTRAMUSCULAR; INTRAVENOUS; SUBCUTANEOUS at 12:39

## 2019-10-29 RX ADMIN — ENOXAPARIN SODIUM 40 MG: 40 INJECTION SUBCUTANEOUS at 08:33

## 2019-10-29 RX ADMIN — PANTOPRAZOLE SODIUM 40 MG: 40 TABLET, DELAYED RELEASE ORAL at 07:27

## 2019-10-29 RX ADMIN — HYDROMORPHONE HYDROCHLORIDE 2 MG: 2 TABLET ORAL at 13:36

## 2019-10-29 RX ADMIN — DOCUSATE SODIUM 100 MG: 100 CAPSULE, LIQUID FILLED ORAL at 18:44

## 2019-10-29 RX ADMIN — Medication 10 ML: at 12:40

## 2019-10-29 RX ADMIN — DOCUSATE SODIUM 100 MG: 100 CAPSULE, LIQUID FILLED ORAL at 08:32

## 2019-10-29 RX ADMIN — WATER 2 G: 1 INJECTION INTRAMUSCULAR; INTRAVENOUS; SUBCUTANEOUS at 05:18

## 2019-10-29 RX ADMIN — MORPHINE SULFATE 2 MG: 2 INJECTION, SOLUTION INTRAMUSCULAR; INTRAVENOUS at 14:49

## 2019-10-29 RX ADMIN — DIAZEPAM 5 MG: 5 TABLET ORAL at 10:32

## 2019-10-29 RX ADMIN — PANTOPRAZOLE SODIUM 40 MG: 40 TABLET, DELAYED RELEASE ORAL at 16:41

## 2019-10-29 RX ADMIN — SIMVASTATIN 20 MG: 20 TABLET, FILM COATED ORAL at 08:32

## 2019-10-29 NOTE — PROGRESS NOTES
1830: Patient has not voided. Bladder scan shows 241mL. Will continue to monitor. Bedside shift change report given to [address] (oncoming nurse) by Ana Luisa Joseph RN (offgoing nurse). Report included the following information SBAR, Kardex, Intake/Output, MAR and Recent Results.

## 2019-10-29 NOTE — PROGRESS NOTES
10- Reason for Admission:   Bilateral Breast Reduction                   RRAT Score:  2                   Plan for utilizing home health:  TBD                        Current Advanced Directive/Advance Care Plan: Not on File                         Transition of Care Plan:                    1. Plans to return home  2. Follow to determine if home health is needed    I met with the patient to determine potential discharge needs. She lives with her , Rhiannon Redd ([phone]), in a one level house with 4 entry steps. She is independent with her ADL's, uses no assistive devices, works full-time and drives. Her PCP is Dr. Guillermo Marrero who has no nurse navigator. She has prescription drug coverage and uses [facility] at [place] [id number]. Observation status was explained to her, she signed the [facility] [place] letter, was given a copy and the original was placed in her chart. She is not anticipating any discharge needs and her  will transport her home.     Care Management Interventions  PCP Verified by CM: Yes(Dr. Joann Wall-no nurse navigator)  Discharge Durable Medical Equipment: No  Physical Therapy Consult: No  Occupational Therapy Consult: No  Speech Therapy Consult: No  Current Support Network: Lives with Spouse, Own Home  Confirm Follow Up Transport: Family  Plan discussed with Pt/Family/Caregiver: Yes  Discharge Location  Discharge Placement: (Home with )    [place]up Samuel, [place], CM

## 2019-10-29 NOTE — PROGRESS NOTES
Problem: Falls - Risk of  Goal: *Absence of Falls  Description  Document Kevin Goodman Fall Risk and appropriate interventions in the flowsheet.   Outcome: Progressing Towards Goal  Note:   Fall Risk Interventions:  Mobility Interventions: Communicate number of staff needed for ambulation/transfer, Patient to call before getting OOB         Medication Interventions: Assess postural VS orthostatic hypotension, Patient to call before getting OOB, Teach patient to arise slowly    Elimination Interventions: Call light in reach, Patient to call for help with toileting needs, Stay With Me (per policy), Toileting schedule/hourly rounds              Problem: Patient Education: Go to Patient Education Activity  Goal: Patient/Family Education  Outcome: Progressing Towards Goal     Problem: Pain  Goal: *Control of Pain  Outcome: Progressing Towards Goal     Problem: Patient Education: Go to Patient Education Activity  Goal: Patient/Family Education  Outcome: Progressing Towards Goal     Problem: Surgical Pathway Post-Op Day 1  Goal: Activity/Safety  Outcome: Progressing Towards Goal  Goal: Diagnostic Test/Procedures  Outcome: Progressing Towards Goal  Goal: Nutrition/Diet  Outcome: Progressing Towards Goal  Goal: Discharge Planning  Outcome: Progressing Towards Goal  Goal: Medications  Outcome: Progressing Towards Goal  Goal: Respiratory  Outcome: Progressing Towards Goal  Goal: Treatments/Interventions/Procedures  Outcome: Progressing Towards Goal  Goal: Psychosocial  Outcome: Progressing Towards Goal  Goal: *No signs and symptoms of infection or wound complications  Outcome: Progressing Towards Goal  Goal: *Optimal pain control at patient's stated goal  Outcome: Progressing Towards Goal  Goal: *Adequate urinary output (equal to or greater than 30 milliliters/hour)  Outcome: Progressing Towards Goal  Goal: *Hemodynamically stable  Outcome: Progressing Towards Goal  Goal: *Tolerating diet  Outcome: Progressing Towards Goal  Goal: *Demonstrates progressive activity  Outcome: Progressing Towards Goal  Goal: *Lungs clear or at baseline  Outcome: Progressing Towards Goal

## 2019-10-29 NOTE — PROGRESS NOTES
2033: Patient still has not voided. Bladder scan shows 277ml. Patient wants to make one more attempt to urinate before burch placement (per MD order). 2039: Pt c/o indigestion, requesting tums for relief. Dr. Lennox Pascal notified by phone, gave order for prn tums. 2330: Pt has voided very small amounts of urine (total of 300ml over several attempts); however, still feels that bladder is full. Bladder scan shows 706ml of urine. Urinary catheter inserted per MD order, with initial return of 750ml of clear yellow urine. Patient tolerated procedure well. 10/29  0747: Bedside and Verbal shift change report given to Marco A Whaley (oncoming nurse) by Sofia Mcclellan (offgoing nurse). Report included the following information SBAR, Kardex, Intake/Output, MAR and Recent Results.

## 2019-10-29 NOTE — PROGRESS NOTES
1100: Patient ambulated to bathroom. Voided 100mL. Post void scan shows retaining 397. Will give more time to void. 1245: Patient ambulated to bathroom. Patient voided 625mL. Patient still feels some pressure. Post void bladder scan shows 525mL. 1315: Spoke to [facility]. Orders to replace burch. Patient will stay another night. 1345: Burch placed without difficulty. 750ml clear, yellow urine draining. 1440: Patient with report of unrelieved pain. Now 8/10. Requesting something additional for pain control. Notified. Orders to give morphine now. Other orders to follow    1545: Patient resting quietly. Reports pain relief. Now 5/10    Bedside shift change report given to [address] (oncoming nurse) by Emili Claudio RN (offgoing nurse). Report included the following information SBAR, Kardex, Intake/Output, MAR and Recent Results.

## 2019-10-29 NOTE — PROGRESS NOTES
POD #1  Patient doing well. Pain controlled with PCA. Tolerating liquid diet. VSS. Afebrile  Breasts soft bilaterally. Incisions clean, dry, and intact. Nipples perfusing well, sensation intact bilaterally.   Drains Serosangiouness  1) D/C when ready  2) Advance diet  3) OOB  4) D/C PCA  5) NATALIE drain care

## 2019-10-29 NOTE — PROGRESS NOTES
Bedside and Verbal shift change report given to Jonh Dockery (oncoming nurse) by Caterina Huerta (offgoing nurse). Report included the following information SBAR, Kardex, OR Summary, Intake/Output, MAR and Recent Results.

## 2019-10-30 VITALS
DIASTOLIC BLOOD PRESSURE: 81 MMHG | WEIGHT: 144.4 LBS | BODY MASS INDEX: 27.26 KG/M2 | OXYGEN SATURATION: 97 % | HEIGHT: 61 IN | SYSTOLIC BLOOD PRESSURE: 132 MMHG | TEMPERATURE: 98 F | RESPIRATION RATE: 18 BRPM | HEART RATE: 63 BPM

## 2019-10-30 PROCEDURE — 90686 IIV4 VACC NO PRSV 0.5 ML IM: CPT | Performed by: SURGERY

## 2019-10-30 PROCEDURE — 0HBV0ZZ EXCISION OF BILATERAL BREAST, OPEN APPROACH: ICD-10-PCS | Performed by: SURGERY

## 2019-10-30 PROCEDURE — 74011250636 HC RX REV CODE- 250/636: Performed by: SURGERY

## 2019-10-30 PROCEDURE — 99218 HC RM OBSERVATION: CPT

## 2019-10-30 PROCEDURE — 74011250636 HC RX REV CODE- 250/636: Performed by: PHYSICIAN ASSISTANT

## 2019-10-30 PROCEDURE — 74011000250 HC RX REV CODE- 250: Performed by: PHYSICIAN ASSISTANT

## 2019-10-30 PROCEDURE — 90471 IMMUNIZATION ADMIN: CPT

## 2019-10-30 PROCEDURE — 74011250637 HC RX REV CODE- 250/637: Performed by: PHYSICIAN ASSISTANT

## 2019-10-30 PROCEDURE — 94760 N-INVAS EAR/PLS OXIMETRY 1: CPT

## 2019-10-30 PROCEDURE — 0JX60ZC TRANSFER CHEST SUBCUTANEOUS TISSUE AND FASCIA WITH SKIN, SUBCUTANEOUS TISSUE AND FASCIA, OPEN APPROACH: ICD-10-PCS | Performed by: SURGERY

## 2019-10-30 RX ADMIN — DOCUSATE SODIUM 100 MG: 100 CAPSULE, LIQUID FILLED ORAL at 08:23

## 2019-10-30 RX ADMIN — HYDROMORPHONE HYDROCHLORIDE 2 MG: 2 TABLET ORAL at 10:20

## 2019-10-30 RX ADMIN — DIAZEPAM 5 MG: 5 TABLET ORAL at 00:53

## 2019-10-30 RX ADMIN — INFLUENZA VIRUS VACCINE 0.5 ML: 15; 15; 15; 15 SUSPENSION INTRAMUSCULAR at 08:29

## 2019-10-30 RX ADMIN — HYDROMORPHONE HYDROCHLORIDE 4 MG: 2 TABLET ORAL at 03:36

## 2019-10-30 RX ADMIN — ACETAMINOPHEN 650 MG: 325 TABLET ORAL at 08:23

## 2019-10-30 RX ADMIN — ENOXAPARIN SODIUM 40 MG: 40 INJECTION SUBCUTANEOUS at 08:24

## 2019-10-30 RX ADMIN — WATER 2 G: 1 INJECTION INTRAMUSCULAR; INTRAVENOUS; SUBCUTANEOUS at 05:58

## 2019-10-30 RX ADMIN — Medication 10 ML: at 05:59

## 2019-10-30 RX ADMIN — SIMVASTATIN 20 MG: 20 TABLET, FILM COATED ORAL at 08:24

## 2019-10-30 RX ADMIN — DIAZEPAM 5 MG: 5 TABLET ORAL at 07:02

## 2019-10-30 RX ADMIN — PANTOPRAZOLE SODIUM 40 MG: 40 TABLET, DELAYED RELEASE ORAL at 05:59

## 2019-10-30 NOTE — PROGRESS NOTES
Transition of Care Plan:                    1. Return home. Pt's  will provide transportation. 2. Follow up with PCP. CM also provided pt with information on [facility].     [facility], LCSW

## 2019-10-30 NOTE — OP NOTES
[facility]  OPERATIVE REPORT    Name:  Augusto Marks  MR#:  [id number]  :  1965  ACCOUNT #:  [de-identified]  DATE OF SERVICE:  10/28/2019      PREOPERATIVE DIAGNOSES:  Chronic back pain, shoulder pain, neck pain, inframammary intertrigo and bra strap grooving secondary to macromastia. POSTOPERATIVE DIAGNOSES:  Chronic back pain, shoulder pain, neck pain, inframammary intertrigo and bra strap grooving secondary to macromastia. PROCEDURES PERFORMED:  1.  Bilateral breast reduction. 2.  Creation of inferior pedicle fasciocutaneous flap, right and left breasts. SURGEON:  Minna Danielle MD    ASSISTANT:  Karen Person PA-C. Due to the complexity of this procedure, surgical assistance was required. SRINATH Huertas assisted with the resection of redundant breast tissue, creation of the superior and inferior fasciocutaneous breast flaps, irrigation, hemostasis, drain placement and complex closure of the breast.    ANESTHESIA:  General endotracheal.    ESTIMATED BLOOD LOSS:  50 mL. DRAINS:  19-Swedish Bashir x2. IMPLANTS:  None. SPECIMENS REMOVED:  1. Total excised tissue, right breast, 1020 g. 2.  Total excised tissue, left breast, 1210 g. COMPLICATIONS:  None. FINDINGS:  None. COUNTS:  Correct x2. DISPOSITION:  Stable to PACU.    BRIEF HISTORY:  The patient is a 49-year-old female with a long-term history of chronic back pain, shoulder pain, neck pain, inframammary intertrigo and bra strap grooving secondary to macromastia. She now presents for bilateral breast reduction. Bilateral breast reduction via a Wise pattern skin approach with superomedial pedicle nipple transposition and possible free nipple grafting is offered. The overall procedure, incisional approach, expected outcomes and recovery were discussed.   The risks, benefits and alternatives to the procedure including but not limited to bleeding, infection, damage to surrounding tissue structures, the need for revisional surgery, seroma, hematoma, skin flap loss, fat necrosis, partial or total loss of the nipple, partial or total loss of sensation to the nipple, hypertrophic scarring and asymmetry were discussed. Postoperative cup size cannot be guaranteed. The patient agreed to proceed. PROCEDURE NOTE:  The preoperative markings were made with the patient in the standing position. Informed consent was obtained. The patient was taken to the operating room, placed supine on the operating table. Sequential compression boots were placed. General endotracheal anesthesia was obtained. A lower body Yue Hugger was placed. The chest was prepped and draped in the usual sterile fashion. The preoperative markings were injected with 40 mL of 0.25% lidocaine with 1:400,000 epinephrine. Bilateral circumareolar incisions were designed with the nipple on moderate stretch using a 38 mm cookie cutter. Bilateral superomedial pedicles were designed 8 cm in length, 5 cm wide. Bilateral inferior pedicles were designed along the meridian at the level of the inframammary fold, 5 cm wide, 7 cm long. These were designed and created to improve lower pole contour and to reduce dead space at the triple point closure. Both breasts were then infused with 500 mL of standard tumescent solution consisting of 1 liter of warm lactated Ringer's mixed with 1 ampule of epinephrine and 10 mL of 2% lidocaine plain using a Lamis infiltrating cannula. This was done to reduce intraoperative blood loss and to improve dissection using the plasma blade. The right circumareolar incision was made sharply. The vertical, horizontal and inframammary incisions were made. The superomedial pedicle and inferior pedicle were incised and de-epithelialized. The inferior pedicle was then dissected along its medial, lateral and superior aspects directed onto the chest wall fascia capturing underlying pectoral perforators.   The medial, lateral and inferior aspects of the superomedial pedicle were then dissected to the chest wall fascia. The vertical and horizontal incisions were deepened with the plasma blade to the chest wall fascia creating a superiorly-based fasciocutaneous breast flap. The medial and lateral aspects of the inframammary incision were then deepened with the electrocautery directed onto the chest wall fascia. The medial, central and lateral breast wedges were then removed en bloc and passed off the operating table for pathology. The right side weighed 1020 g. Hemostasis was secured throughout with 2-0 Vicryl stick ties and electrocautery. The entire wound bed was then irrigated with 2 liters of triple antibiotic solution. A #19-Tamazight Phil Ly drain was brought through a lateral stab incision and secured to the skin with 3-0 nylon and placed within the wound bed. The superior fasciocutaneous breast flap was transposed inferiorly to its new anatomic position and tailor tacked closed with a 0 Prolene key stitch and surgical staples. A 2 x 2 cm triangular skin dermal flap was created at the level of the inframammary fold along the meridian. This was inset into the vertical incision in a V-Y fashion to reduce skin tension at the triple point closure. The inframammary wound was closed with running deep dermal 2-0 Monocryl and running subcuticular 3-0 Monocryl. The vertical incision was closed with interrupted deep dermal 3-0 Monocryl. The nipple-areolar complex was brought through a new aperture 8 cm superior to the inframammary fold along the meridian. This was then inset into a 38 mm aperture marked with a cookie cutter. The aperture was incised and de-epithelialized. The nipple-areolar complex was brought up through this new aperture in proper orientation without tension and inset with interrupted deep dermal 3-0 Monocryl. The vertical and circumareolar incisions were closed with running subcuticular 3-0 Monocryl.   The entire operation was repeated on the contralateral left side exactly for a total resection of 1210 g. Symmetry and volume were satisfactory. The wounds were then dressed with Dermabond, 4x4s, and Medipore tape. A surgical bra was placed. The nipple and skin flaps were perfusing well at the end of the case. Anesthesia was then discontinued. The patient was extubated in the operating room having  tolerated the procedure well. The needle, instrument and laparotomy pad counts at the end of the case were correct.       Chaim Urena MD      [id number]  D:  10/30/2019 9:20  T:  10/30/2019 16:17  JOB #:  [id number]

## 2019-10-30 NOTE — PROGRESS NOTES
Discharge order noted. Patient ate full breakfast. Patient's burch removed, patient denies needing to use restroom at this time. Patient's flu shot administered. Patient verbalizes understanding of plan to be discharged later today after she urinates and NATALIE drains are removed. Patient's  to drive her home. 1230: Patient given discharge instructions, post op instructions and follow up care. Patient and  verbalized understanding of discharge instructions, follow up and care. Patient's L NATALIE drain site dressing reinforced. Patient out of unit in wheelchair,  driving patient to home.

## 2019-10-31 ENCOUNTER — PATIENT OUTREACH (OUTPATIENT)
Dept: CASE MANAGEMENT | Age: 54
End: 2019-10-31

## 2019-10-31 NOTE — PROGRESS NOTES
Hospital Discharge Follow-Up      Date/Time:  10/31/2019 9:19 AM    Patient was admitted to [facility] on 10/28 and discharged on 10/30/19  for elective bilateral breast reduction surgery- Dr. Juancarlos Bruce - [phone]. . The physician discharge summary was not available at the time of outreach. Patient was contacted within 1 business days of discharge. Lm on home and cell #s - request call for discharge instruction review and check in - post operatively. Pt return call and she is very complementary of the hospital staff and care team.     Surgical note - Dr. Janey Lau [phone]. PREOPERATIVE DIAGNOSES:  Chronic back pain, shoulder pain, neck pain, inframammary intertrigo and bra strap grooving secondary to macromastia.   POSTOPERATIVE DIAGNOSES:  Chronic back pain, shoulder pain, neck pain, inframammary intertrigo and bra strap grooving secondary to macromastia.     PROCEDURES PERFORMED:  1.  Bilateral breast reduction. 2.  Creation of inferior pedicle fasciocutaneous flap, right and left breasts.     SURGEON:  Isabela Baptiste MD     ASSISTANT:  Michelet Cordon PA-C. Due to the complexity of this procedure, surgical assistance was required. SRINATH Price assisted with the resection of redundant breast tissue, creation of the superior and inferior fasciocutaneous breast flaps, irrigation, hemostasis, drain placement and complex closure of the breast.     ANESTHESIA:  General endotracheal.      DRAINS:  19-Occitan Bashir x2.   IMPLANTS:  None.     SPECIMENS REMOVED:  1. Total excised tissue, right breast, 1020 g. 2.  Total excised tissue, left breast, 1210 g.     BRIEF HISTORY:  The patient is a 58-year-old female with a long-term history of chronic back pain, shoulder pain, neck pain, inframammary intertrigo and bra strap grooving secondary to macromastia. She now presents for bilateral breast reduction.      Top Challenges reviewed with the provider   Post operative bilateral breast reduction -   Hx osteoporosis - with medication tx  Advance Care Planning:   Does patient have an Advance Directive:  not on file; education provided        Method of communication with provider :chart routing, staff message, phone    Care Transition Nurse (CTN) contacted the patient by telephone to perform post hospital discharge assessment. Verified name and  with patient as identifiers. Provided introduction to self, and explanation of the CTN role. Patient received hospital discharge instructions. CTN reviewed discharge instructions and red flags with patient who verbalized understanding. Patient given an opportunity to ask questions and does not have any further questions or concerns at this time. The patient agrees to contact the PCP office for questions related to their healthcare. CTN provided contact information for future reference. Disease Specific:  Post surgical / bilateral breast reduction    Patients top risk factors for readmission:  medical condition/post operative     Home Health orders at discharge: [address]: none  Date of initial visit: none    Durable Medical Equipment ordered at discharge: none  [address]: none  Durable Medical Equipment received: n/a    Medication(s):   New Medications at Discharge: none  Changed Medications at Discharge: none  Discontinued Medications at Discharge: none  Will check on pain management and nausea tx as alluded to in post op notes. Medication reconciliation was performed with patient, who verbalizes understanding of administration of home medications. There were no barriers to obtaining medications identified at this time. Referral to Pharm D needed: no     [facility] follow up appointment(s): No future appointments. Non-[facility] follow up appointment(s): Dr. Zhen Mendoza - plastic surgeon [phone]. [facility]:  information provided as a resource     Goals      Returns to baseline activity level.       10/31/19  Post operative -   No heavy exercise or liftin gfor 3 weeks - post op  Restrict arem movements. Move arms slowly and avoid jerky movements - of chest and breast area. Keep arms close to body -   Wear post surgery bra - constantly day and night -   Shower 2 days post op - /replacing bra afterwards -   Shower instructions on AVS pg 6 - has pt reviewed/ any questions? ttk       Understands red flags post discharge. 10/31/19  Post operative     Report breast swelling - with significant changes in one breast -   Significant bruising across the chest -  Increase in pain in the breast - after improvement noted. Temp oaver 101.5 F  Redness like sunburn aroudn incisions. Any post operative s/s - MD on call 24/7 at [phone] - for surgery - Dr. Jamila Herrera.   ttk          Marcos Castillo RN , [facility], [facility]  Care transitions nurse/ [facility]   [phone]

## 2019-12-10 ENCOUNTER — TELEPHONE (OUTPATIENT)
Dept: PRIMARY CARE CLINIC | Age: 54
End: 2019-12-10

## 2019-12-10 DIAGNOSIS — R79.89 LOW VITAMIN D LEVEL: Primary | ICD-10-CM

## 2019-12-10 DIAGNOSIS — R73.03 PREDIABETES: ICD-10-CM

## 2019-12-10 DIAGNOSIS — Z00.00 LABORATORY TESTS ORDERED AS PART OF A COMPLETE PHYSICAL EXAM (CPE): Primary | ICD-10-CM

## 2019-12-10 NOTE — PROGRESS NOTES
Labs ordered ordered for physical with Dr. Wilfred Lee on 12/30/2019.   Labs will be reviewed with patient by Dr. Wilfred Lee at  patient's physical.

## 2019-12-10 NOTE — TELEPHONE ENCOUNTER
Pt stated that she needs to come in for blood work and needs the doctor to put in the order. Requested a call back.

## 2019-12-10 NOTE — TELEPHONE ENCOUNTER
Pt scheduled CPE 12/31/19. Advised that she comes in 2-3 days prior for blood work. She will come in on the 26th or 27th. Please put in the order for her so she can get this done on the day she comes in.

## 2020-02-14 LAB
ALBUMIN SERPL-MCNC: 4.7 G/DL (ref 3.8–4.9)
ALBUMIN/GLOB SERPL: 1.7 {RATIO} (ref 1.2–2.2)
ALP SERPL-CCNC: 90 IU/L (ref 39–117)
ALT SERPL-CCNC: 44 IU/L (ref 0–32)
AST SERPL-CCNC: 24 IU/L (ref 0–40)
BILIRUB SERPL-MCNC: 0.5 MG/DL (ref 0–1.2)
BUN SERPL-MCNC: 9 MG/DL (ref 6–24)
BUN/CREAT SERPL: 16 (ref 9–23)
CALCIUM SERPL-MCNC: 9.7 MG/DL (ref 8.7–10.2)
CHLORIDE SERPL-SCNC: 100 MMOL/L (ref 96–106)
CHOLEST SERPL-MCNC: 310 MG/DL (ref 100–199)
CO2 SERPL-SCNC: 24 MMOL/L (ref 20–29)
COMMENT, 011824: ABNORMAL
CREAT SERPL-MCNC: 0.56 MG/DL (ref 0.57–1)
ERYTHROCYTE [DISTWIDTH] IN BLOOD BY AUTOMATED COUNT: 14.5 % (ref 11.7–15.4)
GLOBULIN SER CALC-MCNC: 2.7 G/DL (ref 1.5–4.5)
GLUCOSE SERPL-MCNC: 88 MG/DL (ref 65–99)
HCT VFR BLD AUTO: 40.7 % (ref 34–46.6)
HDLC SERPL-MCNC: 60 MG/DL
HGB BLD-MCNC: 13.5 G/DL (ref 11.1–15.9)
LDLC SERPL CALC-MCNC: 217 MG/DL (ref 0–99)
MCH RBC QN AUTO: 29.3 PG (ref 26.6–33)
MCHC RBC AUTO-ENTMCNC: 33.2 G/DL (ref 31.5–35.7)
MCV RBC AUTO: 88 FL (ref 79–97)
PLATELET # BLD AUTO: 224 X10E3/UL (ref 150–450)
POTASSIUM SERPL-SCNC: 5 MMOL/L (ref 3.5–5.2)
PROT SERPL-MCNC: 7.4 G/DL (ref 6–8.5)
RBC # BLD AUTO: 4.61 X10E6/UL (ref 3.77–5.28)
SODIUM SERPL-SCNC: 143 MMOL/L (ref 134–144)
TRIGL SERPL-MCNC: 164 MG/DL (ref 0–149)
TSH SERPL DL<=0.005 MIU/L-ACNC: 0.58 UIU/ML (ref 0.45–4.5)
VLDLC SERPL CALC-MCNC: 33 MG/DL (ref 5–40)
WBC # BLD AUTO: 5.8 X10E3/UL (ref 3.4–10.8)

## 2020-02-19 ENCOUNTER — OFFICE VISIT (OUTPATIENT)
Dept: PRIMARY CARE CLINIC | Age: 55
End: 2020-02-19

## 2020-02-19 VITALS
WEIGHT: 143.4 LBS | OXYGEN SATURATION: 100 % | HEIGHT: 61 IN | RESPIRATION RATE: 18 BRPM | DIASTOLIC BLOOD PRESSURE: 83 MMHG | SYSTOLIC BLOOD PRESSURE: 135 MMHG | HEART RATE: 74 BPM | TEMPERATURE: 98 F | BODY MASS INDEX: 27.08 KG/M2

## 2020-02-19 DIAGNOSIS — M81.0 OSTEOPOROSIS, UNSPECIFIED OSTEOPOROSIS TYPE, UNSPECIFIED PATHOLOGICAL FRACTURE PRESENCE: ICD-10-CM

## 2020-02-19 DIAGNOSIS — E78.5 HYPERLIPIDEMIA LDL GOAL <100: ICD-10-CM

## 2020-02-19 DIAGNOSIS — E55.9 VITAMIN D DEFICIENCY: ICD-10-CM

## 2020-02-19 DIAGNOSIS — Z00.00 WELL ADULT ON ROUTINE HEALTH CHECK: Primary | ICD-10-CM

## 2020-02-19 RX ORDER — SIMVASTATIN 20 MG/1
TABLET, FILM COATED ORAL
Qty: 90 TAB | Refills: 0 | Status: SHIPPED | OUTPATIENT
Start: 2020-02-19 | End: 2020-06-10 | Stop reason: SDUPTHER

## 2020-02-19 NOTE — PROGRESS NOTES
Chief Complaint   Patient presents with    Vitamin D Deficiency     overdue 3 mo follow up     Osteoporosis    Results     would like to obtan lab results    Medication Refill     90 day       1. Have you been to the ER, urgent care clinic since your last visit? Hospitalized since your last visit? No    2. Have you seen or consulted any other health care providers outside of the [address] [place] [address] since your last visit? Include any pap smears or colon screening.  No

## 2020-02-19 NOTE — TELEPHONE ENCOUNTER
Patient states she has not received her Prolia. Never heard back from the pharmacy nor the office  regarding if this medication has been approved or not. And need to [facility] to check on her Vitamin D level and her A1C levels.

## 2020-02-19 NOTE — PROGRESS NOTES
Subjective:   62 y.o. female for Well Woman Check. She is also here for follow up on cholesterol level. Patient had breast reduction surgery done on 10/28/2020. States that she has been feeling great. She is no longer having any back pain. Her blood work was done already but seems like [facility] did not collect A1c and Vit D . Patient reports that she has stopped taking her cholesterol med for few months. Cholesterol level is very elevated. Patient reports that she never received any call from scheduling department for Prolia for osteoporosis. She is not taking any Vit D supplement currently. Her gyne and breast care is done elsewhere by her Ob-Gyne physician. She exercises regularly. 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  denosumab (PROLIA) 60 mg/mL injection 1 mL by SubCUTAneous route every 6 months. 1 mL 0    ergocalciferol (ERGOCALCIFEROL) 50,000 unit capsule Take 1 Cap by mouth every seven (7) days. 8 Cap 0     Allergies   Allergen Reactions    Advil [Ibuprofen] Swelling     Lips only.  No resp sxs     Past Medical History:   Diagnosis Date    Adverse effect of anesthesia     Urinary retention    GERD (gastroesophageal reflux disease)     Hypercholesteremia     Hypercholesterolemia     Osteoporosis      Past Surgical History:   Procedure Laterality Date    HX BREAST REDUCTION Bilateral 10/28/2019    BILATERAL BREAST REDUCTION performed by Nitza Kuhn MD at [facility] HX COLONOSCOPY      internal hemroid 2009    HX HEMORRHOIDECTOMY  2009    HX HERNIA REPAIR  2004     No family history on file.  Social History     Tobacco Use    Smoking status: Former Smoker     Last attempt to quit: 10/24/1997     Years since quittin.3    Smokeless tobacco: Never Used   Substance Use Topics    Alcohol use: Yes     Alcohol/week: 6.0 standard drinks     Types: 6 Glasses of wine per week        Lab Results   Component Value Date/Time    WBC 5.8 2020 11:00 AM    HGB 13.5 2020 11:00 AM    HCT 40.7 2020 11:00 AM    PLATELET 433  11:00 AM    MCV 88 2020 11:00 AM     Lab Results   Component Value Date/Time    Hemoglobin A1c 5.7 (H) 04/15/2019 10:10 AM    Hemoglobin A1c 5.7 (H) 2018 02:52 PM    Glucose 88 2020 11:00 AM    LDL, calculated 217 (H) 2020 11:00 AM    Creatinine 0.56 (L) 2020 11:00 AM      Lab Results   Component Value Date/Time    ALT (SGPT) 44 (H) 2020 11:00 AM    AST (SGOT) 24 2020 11:00 AM    Alk. phosphatase 90 2020 11:00 AM    Bilirubin, total 0.5 2020 11:00 AM    Albumin 4.7 2020 11:00 AM    Protein, total 7.4 2020 11:00 AM    INR 1.0 10/24/2019 03:21 PM    Prothrombin time 10.3 10/24/2019 03:21 PM    PLATELET 229 64/15/8843 11:00 AM     Lab Results   Component Value Date/Time    GFR est non- 2020 11:00 AM    GFR est  2020 11:00 AM    Creatinine 0.56 (L) 2020 11:00 AM    BUN 9 2020 11:00 AM    Sodium 143 2020 11:00 AM    Potassium 5.0 2020 11:00 AM    Chloride 100 2020 11:00 AM    CO2 24 2020 11:00 AM     Lab Results   Component Value Date/Time    TSH 0.585 2020 11:00 AM    T4, Free 1.41 2018 02:52 PM      Lab Results   Component Value Date/Time    Hemoglobin A1c 5.7 (H) 04/15/2019 10:10 AM         ROS: Feeling generally well. No TIA's or unusual headaches, no dysphagia. No prolonged cough. No dyspnea or chest pain on exertion. No abdominal pain, change in bowel habits, black or bloody stools. No urinary tract symptoms.   No new or unusual musculoskeletal symptoms. Specific concerns today: refer to HPI. Objective: The patient appears well, alert, oriented x 3, in no distress. Visit Vitals  /83 (BP 1 Location: Right arm, BP Patient Position: Sitting)   Pulse 74   Temp 98 °F (36.7 °C) (Oral)   Resp 18   Ht 5' 1\" (1.549 m)   Wt 143 lb 6.4 oz (65 kg)   SpO2 100%   BMI 27.10 kg/m²     ENT normal.  Neck supple. No adenopathy or thyromegaly. LAUREL. Lungs are clear, good air entry, no wheezes, rhonchi or rales. S1 and S2 normal, no murmurs, regular rate and rhythm. Abdomen soft without tenderness, guarding, mass or organomegaly. Extremities show no edema, normal peripheral pulses. Neurological is normal, no focal findings. Breast and Pelvic exams are deferred. Assessment/Plan:   Well Woman  lose weight, increase physical activity, follow low fat diet, follow low salt diet, continue present plan, routine labs ordered, call if any problems    ICD-10-CM ICD-9-CM    1. Well adult on routine health check Z00.00 V70.0    2. Hyperlipidemia LDL goal <100 E78.5 272.4 simvastatin (ZOCOR) 20 mg tablet   3. Osteoporosis, unspecified osteoporosis type, unspecified pathological fracture presence M81.0 733.00    4. Vitamin D deficiency E55.9 268.9      Diagnoses and all orders for this visit:    1. Well adult on routine health check    2. Hyperlipidemia LDL goal <100  -    Restart   simvastatin (ZOCOR) 20 mg tablet; TAKE 1 TABLET BY MOUTH EVERY NIGHT            Counseled patient to be complaint with med. Continue work on diet and exercise. 3. Osteoporosis, unspecified osteoporosis type, unspecified pathological fracture presence       Asked nurse to follow up on the prolia injection. 4. Vitamin D deficiency        [facility] did not draw the blood for Vit D and A1c. Will call [facility] to see if they can add these. If not then we will call patient to come back for the blood work. Sent message to nurse.     Follow-up and Dispositions · Return in about 3 months (around 5/19/2020) for Cholesterol, , have fasting blood work done 2-3 days prior. .       reviewed diet, exercise and weight control  reviewed medications and side effects in detail  Discussed med compliance.

## 2020-02-20 ENCOUNTER — TELEPHONE (OUTPATIENT)
Dept: PRIMARY CARE CLINIC | Age: 55
End: 2020-02-20

## 2020-02-20 NOTE — TELEPHONE ENCOUNTER
Prolia order sent for insurance verification. Advised patient to wait a couple weeks until it is verified and then we will send the order to the infusion center to get it done. Patient notified and verbalized her understanding. Patient will come in only for Vit D and Hemoglobin A1c.

## 2020-02-20 NOTE — TELEPHONE ENCOUNTER
Patient spoke with Augusta regarding her labs and medication.  Damien Mclain has taken care of the patients request.

## 2020-02-20 NOTE — TELEPHONE ENCOUNTER
LVM informing patient that it has been a week since blood was drawn, she needs to come back in Monday-Friday 8-4 for labs and do not need to be fasting. Also she needs to call back and let me know which location to send the prolia order to.

## 2020-03-13 ENCOUNTER — HOSPITAL ENCOUNTER (OUTPATIENT)
Dept: LAB | Age: 55
Discharge: HOME | End: 2020-03-13
Payer: COMMERCIAL

## 2020-03-13 PROCEDURE — 88305 TISSUE EXAM BY PATHOLOGIST: CPT

## 2020-06-09 ENCOUNTER — TELEPHONE (OUTPATIENT)
Dept: PRIMARY CARE CLINIC | Age: 55
End: 2020-06-09

## 2020-06-09 NOTE — TELEPHONE ENCOUNTER
I am waiting for her appointment to review lab with her. I will see her tomorrow and discuss and mail result tomorrow.

## 2020-06-09 NOTE — TELEPHONE ENCOUNTER
Patient was calling to speak to a nurse, stated she wants her lab results done from last week and needs a copy of them and a copy of the previous ones

## 2020-06-10 ENCOUNTER — VIRTUAL VISIT (OUTPATIENT)
Dept: PRIMARY CARE CLINIC | Age: 55
End: 2020-06-10

## 2020-06-10 ENCOUNTER — TELEPHONE (OUTPATIENT)
Dept: PRIMARY CARE CLINIC | Age: 55
End: 2020-06-10

## 2020-06-10 DIAGNOSIS — M81.0 OSTEOPOROSIS, UNSPECIFIED OSTEOPOROSIS TYPE, UNSPECIFIED PATHOLOGICAL FRACTURE PRESENCE: ICD-10-CM

## 2020-06-10 DIAGNOSIS — E78.5 HYPERLIPIDEMIA LDL GOAL <100: Primary | ICD-10-CM

## 2020-06-10 DIAGNOSIS — E55.9 VITAMIN D DEFICIENCY: ICD-10-CM

## 2020-06-10 DIAGNOSIS — R73.03 PREDIABETES: ICD-10-CM

## 2020-06-10 RX ORDER — SIMVASTATIN 20 MG/1
TABLET, FILM COATED ORAL
Qty: 90 TAB | Refills: 1 | Status: SHIPPED | OUTPATIENT
Start: 2020-06-10 | End: 2021-01-05 | Stop reason: SDUPTHER

## 2020-06-10 RX ORDER — CHOLECALCIFEROL (VITAMIN D3) 125 MCG
CAPSULE ORAL
COMMUNITY
End: 2021-03-05 | Stop reason: DRUGHIGH

## 2020-06-10 NOTE — TELEPHONE ENCOUNTER
Patient called [address] [place] [address] and they said they never received prolia injection paper works. Could you fax over the paper works and let pt know? She also would like her lab results for this time and December  to mail to her home address.

## 2020-06-10 NOTE — PROGRESS NOTES
Barry Batres is a 60 y.o. female who was seen by synchronous (real-time) audio-video technology on 6/10/2020. Consent: Barry Batres, who was seen by synchronous (real-time) audio-video technology, and/or her healthcare decision maker, is aware that this patient-initiated, Telehealth encounter on 6/10/2020 is a billable service, with coverage as determined by her insurance carrier. She is aware that she may receive a bill and has provided verbal consent to proceed: Yes. Assessment & Plan:     Diagnoses and all orders for this visit:    1. Hyperlipidemia LDL goal <100  -   Cholesterol level has improved greatly. Continue current med and continue work on diet and exercised. simvastatin (ZOCOR) 20 mg tablet; TAKE 1 TABLET BY MOUTH EVERY NIGHT    2. Vitamin D deficiency      Well controlled with 2,000 iu vit d.  3. Prediabetes      A1c is 5.8. Continue maintain healthy diet and exercise. 4. Osteoporosis, unspecified osteoporosis type, unspecified pathological fracture presence      Patient could not tolerate oral bisphosphonate. I asked nurse to follow up on the prolia injection. Follow-up and Dispositions    · Return in about 3 months (around 9/10/2020), or if symptoms worsen or fail to improve, for Cholestreol check up., have fasting blood work done 2-3 days prior. .             Subjective:   Barry Batres is a 60 y.o. female who was seen for Cholesterol Problem (labs done already. would like to discuss. ) and Vitamin D Deficiency    This is a 59 y/o F is here for 6 months follow up. Labs are already done. HLD: She restarted taking Zocor 20 mg for the past three months. LDL was 217. Her recent LDL level is 117. Lab Results   Component Value Date/Time    LDL, calculated 117 (H) 06/04/2020 09:06 AM       Low Vit D: taking Vit d 2,000 iu daily. recent Vit D level came back to normal range. Prediabetes: A1c was 5.7 but recent one came back to 5.8.      Lab Results   Component Value Date/Time    Hemoglobin A1c 5.8 (H) 06/04/2020 09:06 AM     Osteoporosis: reports that she called [facility] for the Prolia follow up but they reported that they never received the form. Pt could not tolerate oral med. Doing well otherwise. Prior to Admission medications    Medication Sig Start Date End Date Taking? Authorizing Provider   simvastatin (ZOCOR) 20 mg tablet TAKE 1 TABLET BY MOUTH EVERY NIGHT 6/10/20  Yes Yobany Wall MD   cholecalciferol, vitamin D3, (Vitamin D3) 50 mcg (2,000 unit) tab Take  by mouth. Indications: low vitamin D levels   Yes Provider, Historical   pantoprazole (PROTONIX) 40 mg tablet Take 40 mg by mouth daily as needed. Yes Provider, Historical   denosumab (PROLIA) 60 mg/mL injection 1 mL by SubCUTAneous route every 6 months. 9/9/19   Yobany Wall MD     Allergies   Allergen Reactions    Advil [Ibuprofen] Swelling     Lips only. 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 Indications: low vitamin D levels      pantoprazole (PROTONIX) 40 mg tablet Take 40 mg by mouth daily as needed.  denosumab (PROLIA) 60 mg/mL injection 1 mL by SubCUTAneous route every 6 months. 1 mL 0     Allergies   Allergen Reactions    Advil [Ibuprofen] Swelling     Lips only. No resp sxs     Past Medical History:   Diagnosis Date    Adverse effect of anesthesia     Urinary retention    GERD (gastroesophageal reflux disease)     Hypercholesteremia     Hypercholesterolemia     Osteoporosis        Review of Systems   Constitutional: Negative for chills and fever. Respiratory: Negative for cough. Cardiovascular: Negative for chest pain and palpitations. Gastrointestinal: Negative for nausea and vomiting. Objective:   Vital Signs: (As obtained by patient/caregiver at home)  There were no vitals taken for this visit. [INSTRUCTIONS:  \"[x]\" Indicates a positive item  \"[]\" Indicates a negative item  -- DELETE ALL ITEMS NOT EXAMINED]    Constitutional: [x] Appears well-developed and well-nourished [x] No apparent distress      [] Abnormal -     Mental status: [x] Alert and awake  [x] Oriented to person/place/time [x] Able to follow commands    [] Abnormal -     Eyes:   EOM    [x]  Normal    [] Abnormal -   Sclera  [x]  Normal    [] Abnormal -          Discharge [x]  None visible   [] Abnormal -     HENT: [x] Normocephalic, atraumatic  [] Abnormal -   [x] Mouth/Throat: Mucous membranes are moist    External Ears [x] Normal  [] Abnormal -    Neck: [x] No visualized mass [] Abnormal -     Pulmonary/Chest: [x] Respiratory effort normal   [x] No visualized signs of difficulty breathing or respiratory distress        [] Abnormal -      Musculoskeletal:   [x] Normal gait with no signs of ataxia         [x] Normal range of motion of neck        [] Abnormal -     Neurological:        [x] No Facial Asymmetry (Cranial nerve 7 motor function) (limited exam due to video visit)          [x] No gaze palsy        [] Abnormal -          Skin:        [x] No significant exanthematous lesions or discoloration noted on facial skin         [] Abnormal -            Psychiatric:       [x] Normal Affect [] Abnormal -        [x] No Hallucinations    Other pertinent observable physical exam findings:-        We discussed the expected course, resolution and complications of the diagnosis(es) in detail. Medication risks, benefits, costs, interactions, and alternatives were discussed as indicated. I advised her to contact the office if her condition worsens, changes or fails to improve as anticipated.  She expressed understanding with the diagnosis(es) and plan. Barry Batres is a 60 y.o. female who was evaluated by a video visit encounter for concerns as above. Patient identification was verified prior to start of the visit. A caregiver was present when appropriate. Due to this being a TeleHealth encounter (During WIKVY-01 public health emergency), evaluation of the following organ systems was limited: Vitals/Constitutional/EENT/Resp/CV/GI//MS/Neuro/Skin/Heme-Lymph-Imm. Pursuant to the emergency declaration under the [facility] waiver authority and the [employer] Act, this Virtual  Visit was conducted, with patient's (and/or legal guardian's) consent, to reduce the patient's risk of exposure to COVID-19 and provide necessary medical care. Services were provided through a video synchronous discussion virtually to substitute for in-person clinic visit. Patient and provider were located at their individual homes.       Vicki Garber MD

## 2020-06-12 NOTE — TELEPHONE ENCOUNTER
I contacted Jeannie Felton about patient's Prolia injection. The lady on the phone states it was completed and sent to the infusion center who was then supposed to contact patient- nothing else was needed from our office. She states that when it was ran in Feb the $5,500 out of pocket costs was not met. Advised her to please rerun benefits. Reverification is being completed today and we should have an answer by the end of the day. I have LVM for patient letting her know what's going on and to call back if need be.

## 2020-06-19 ENCOUNTER — TELEPHONE (OUTPATIENT)
Dept: PRIMARY CARE CLINIC | Age: 55
End: 2020-06-19

## 2020-06-19 NOTE — TELEPHONE ENCOUNTER
Rolando Saucedo from Outpatient infusion needs clarification regarding pts Prolia injections. Would like to know if she is due now or September. Please call back asap.

## 2020-06-19 NOTE — TELEPHONE ENCOUNTER
I called and spoke with Adolfo Ventura. She states she needs a new Prolia form sent in with start date 6/22/2020. I have sent it over to Adolfo Ventura at the [facility] infusion center. Fax confirmation received.

## 2020-06-22 ENCOUNTER — HOSPITAL ENCOUNTER (OUTPATIENT)
Dept: INFUSION THERAPY | Age: 55
Discharge: HOME | End: 2020-06-22
Payer: COMMERCIAL

## 2020-06-22 VITALS
SYSTOLIC BLOOD PRESSURE: 127 MMHG | DIASTOLIC BLOOD PRESSURE: 73 MMHG | RESPIRATION RATE: 18 BRPM | TEMPERATURE: 97 F | HEART RATE: 64 BPM

## 2020-06-22 LAB
ALBUMIN SERPL-MCNC: 4.2 G/DL (ref 3.5–5)
ANION GAP SERPL CALC-SCNC: 7 MMOL/L (ref 5–15)
BUN SERPL-MCNC: 10 MG/DL (ref 6–20)
BUN/CREAT SERPL: 17 (ref 12–20)
CALCIUM SERPL-MCNC: 9.2 MG/DL (ref 8.5–10.1)
CHLORIDE SERPL-SCNC: 102 MMOL/L (ref 97–108)
CO2 SERPL-SCNC: 26 MMOL/L (ref 21–32)
CREAT SERPL-MCNC: 0.6 MG/DL (ref 0.55–1.02)
GLUCOSE SERPL-MCNC: 106 MG/DL (ref 65–100)
MAGNESIUM SERPL-MCNC: 2.2 MG/DL (ref 1.6–2.4)
PHOSPHATE SERPL-MCNC: 3.7 MG/DL (ref 2.6–4.7)
PHOSPHATE SERPL-MCNC: 3.8 MG/DL (ref 2.6–4.7)
POTASSIUM SERPL-SCNC: 3.6 MMOL/L (ref 3.5–5.1)
SODIUM SERPL-SCNC: 135 MMOL/L (ref 136–145)

## 2020-06-22 PROCEDURE — 36415 COLL VENOUS BLD VENIPUNCTURE: CPT

## 2020-06-22 PROCEDURE — 96372 THER/PROPH/DIAG INJ SC/IM: CPT

## 2020-06-22 PROCEDURE — 83735 ASSAY OF MAGNESIUM: CPT

## 2020-06-22 PROCEDURE — 74011250636 HC RX REV CODE- 250/636: Performed by: FAMILY MEDICINE

## 2020-06-22 PROCEDURE — 80069 RENAL FUNCTION PANEL: CPT

## 2020-06-22 PROCEDURE — 84100 ASSAY OF PHOSPHORUS: CPT

## 2020-06-22 RX ADMIN — DENOSUMAB 60 MG: 60 INJECTION SUBCUTANEOUS at 16:10

## 2020-06-22 NOTE — PROGRESS NOTES
Outpatient Infusion Center Progress Note    1500 Pt admit to [facility] for Prolia ambulatory in stable condition. Assessment completed. No new concerns voiced, labs drawn and sent for processing. Visit Vitals  /73   Pulse 64   Temp 97 °F (36.1 °C)   Resp 18   Breastfeeding No       Medications:  Medications Administered     denosumab (PROLIA) injection 60 mg     Admin Date  06/22/2020 Action  Given Dose  60 mg Route  SubCUTAneous Administered By  Steven Castillo A              SC injection given in [place]    1625 Pt tolerated treatment well, D/c home ambulatory in no distress. Pt aware of next appointment scheduled for 12/28/20.

## 2020-09-04 ENCOUNTER — TELEPHONE (OUTPATIENT)
Dept: PRIMARY CARE CLINIC | Age: 55
End: 2020-09-04

## 2020-09-04 DIAGNOSIS — E78.5 HYPERLIPIDEMIA LDL GOAL <100: Primary | ICD-10-CM

## 2020-09-08 NOTE — TELEPHONE ENCOUNTER
Lvm for patient that lipid panel is placed but also she needs to make virtual appointment with Dr Evie Mcmahon. Please call and make the appointemnt.

## 2020-09-08 NOTE — TELEPHONE ENCOUNTER
Please inform patient that Lipid panel ordered. Make sure to have her make an appointment with me to follow up on that. Virtual appointment will be fine. She does not need A1c, Vit d check up- its too early. Thanks.

## 2020-11-30 ENCOUNTER — TELEPHONE (OUTPATIENT)
Dept: PRIMARY CARE CLINIC | Age: 55
End: 2020-11-30

## 2020-11-30 DIAGNOSIS — E78.5 HYPERLIPIDEMIA LDL GOAL <100: Primary | ICD-10-CM

## 2020-11-30 DIAGNOSIS — Z01.89 ENCOUNTER FOR LABORATORY TEST: ICD-10-CM

## 2020-11-30 NOTE — TELEPHONE ENCOUNTER
----- Message from Klaudia Lozano sent at 11/30/2020 10:55 AM EST -----  Regarding: Dr. Gladis Mary Message/Vendor Calls    Caller's first and last name: pt      Reason for call: Questions      Callback required yes/no and why: yes to confirm      Best contact number(s): [phone]      Details to clarify the request: pt would like order for lab test to find out her Blood Type and also for Cholesterol. Also she stated she should be scheduled for Osteoporosis shot that due in December but does not know the date or what hospital it is scheduled at.          Klaudia Lozano

## 2020-12-02 NOTE — TELEPHONE ENCOUNTER
Can you order a lab test to see what lab type someone has? If so please place order for this patient. I'll notify her that she went to [facility] for prolia the last time and will give their contact information. She will also need an updated RX. Please advise.

## 2020-12-02 NOTE — TELEPHONE ENCOUNTER
Lipid and LFT already ordered in chart. Regarding blood type: we don't usually order the test, usually done in hospital setting when its urgent. I can order but it will not cover by her insurance.

## 2020-12-03 NOTE — TELEPHONE ENCOUNTER
I have called patient and let her know that the orders are placed. Patient is aware insurance may not cover blood type lab work. She states \"that is ok I will pay out of pocket, I need to know my blood type. \" Advised patient I would get Dr. Lamine Calhoun to place lab order in system. She is scheduled for a VV f/u appointment on 12/10/2020 @1500. Patient plans to have these labs done prior to appointment.

## 2020-12-11 DIAGNOSIS — E78.5 HYPERLIPIDEMIA LDL GOAL <100: ICD-10-CM

## 2020-12-11 DIAGNOSIS — Z01.89 ENCOUNTER FOR LABORATORY TEST: ICD-10-CM

## 2020-12-11 LAB
ABO + RH BLD: NORMAL
ALBUMIN SERPL-MCNC: 4.3 G/DL (ref 3.5–5)
ALBUMIN/GLOB SERPL: 1.3 {RATIO} (ref 1.1–2.2)
ALP SERPL-CCNC: 57 U/L (ref 45–117)
ALT SERPL-CCNC: 31 U/L (ref 12–78)
AST SERPL-CCNC: 20 U/L (ref 15–37)
BILIRUB DIRECT SERPL-MCNC: <0.1 MG/DL (ref 0–0.2)
BILIRUB SERPL-MCNC: 0.4 MG/DL (ref 0.2–1)
BLOOD BANK CMNT PATIENT-IMP: NORMAL
BLOOD GROUP ANTIBODIES SERPL: NORMAL
CHOLEST SERPL-MCNC: 236 MG/DL
COMMENT, HOLDF: NORMAL
GLOBULIN SER CALC-MCNC: 3.3 G/DL (ref 2–4)
HDLC SERPL-MCNC: 64 MG/DL
HDLC SERPL: 3.7 {RATIO} (ref 0–5)
LDLC SERPL CALC-MCNC: 145.8 MG/DL (ref 0–100)
LIPID PROFILE,FLP: ABNORMAL
PROT SERPL-MCNC: 7.6 G/DL (ref 6.4–8.2)
SAMPLES BEING HELD,HOLD: NORMAL
SPECIMEN EXP DATE BLD: NORMAL
TRIGL SERPL-MCNC: 131 MG/DL (ref ?–150)
VLDLC SERPL CALC-MCNC: 26.2 MG/DL

## 2020-12-16 ENCOUNTER — TELEPHONE (OUTPATIENT)
Dept: PRIMARY CARE CLINIC | Age: 55
End: 2020-12-16

## 2020-12-18 NOTE — TELEPHONE ENCOUNTER
Patient states she will call us on Monday or Tuesday to set up appt. Patient also wanted blood type but advised her that everything will be discussed when she makes appt with Dr. Pat Street.

## 2020-12-28 ENCOUNTER — HOSPITAL ENCOUNTER (OUTPATIENT)
Dept: INFUSION THERAPY | Age: 55
Discharge: HOME | End: 2020-12-28
Payer: COMMERCIAL

## 2020-12-28 VITALS
RESPIRATION RATE: 18 BRPM | DIASTOLIC BLOOD PRESSURE: 75 MMHG | SYSTOLIC BLOOD PRESSURE: 127 MMHG | TEMPERATURE: 97 F | HEART RATE: 71 BPM

## 2020-12-28 LAB
ALBUMIN SERPL-MCNC: 4.2 G/DL (ref 3.5–5)
ANION GAP SERPL CALC-SCNC: 4 MMOL/L (ref 5–15)
BUN SERPL-MCNC: 10 MG/DL (ref 6–20)
BUN/CREAT SERPL: 18 (ref 12–20)
CALCIUM SERPL-MCNC: 9.3 MG/DL (ref 8.5–10.1)
CHLORIDE SERPL-SCNC: 105 MMOL/L (ref 97–108)
CO2 SERPL-SCNC: 30 MMOL/L (ref 21–32)
CREAT SERPL-MCNC: 0.57 MG/DL (ref 0.55–1.02)
GLUCOSE SERPL-MCNC: 101 MG/DL (ref 65–100)
MAGNESIUM SERPL-MCNC: 2.5 MG/DL (ref 1.6–2.4)
PHOSPHATE SERPL-MCNC: 4.2 MG/DL (ref 2.6–4.7)
PHOSPHATE SERPL-MCNC: 4.2 MG/DL (ref 2.6–4.7)
POTASSIUM SERPL-SCNC: 4 MMOL/L (ref 3.5–5.1)
SODIUM SERPL-SCNC: 139 MMOL/L (ref 136–145)

## 2020-12-28 PROCEDURE — 74011250636 HC RX REV CODE- 250/636: Performed by: FAMILY MEDICINE

## 2020-12-28 PROCEDURE — 83735 ASSAY OF MAGNESIUM: CPT

## 2020-12-28 PROCEDURE — 80069 RENAL FUNCTION PANEL: CPT

## 2020-12-28 PROCEDURE — 36415 COLL VENOUS BLD VENIPUNCTURE: CPT

## 2020-12-28 PROCEDURE — 84100 ASSAY OF PHOSPHORUS: CPT

## 2020-12-28 PROCEDURE — 96372 THER/PROPH/DIAG INJ SC/IM: CPT

## 2020-12-28 RX ADMIN — DENOSUMAB 60 MG: 60 INJECTION SUBCUTANEOUS at 17:11

## 2020-12-28 NOTE — PROGRESS NOTES
Outpatient Infusion Center Progress Note    9925 Pt admit to [address] [place] [address] for Prolia ambulatory in stable condition. Assessment completed. No new concerns voiced. Labs drawn and sent for processing. Visit Vitals  /75 (BP 1 Location: Right arm, BP Patient Position: At rest)   Pulse 71   Temp 97 °F (36.1 °C)   Resp 18   Breastfeeding No       Medications:  Medications Administered     denosumab (PROLIA) injection 60 mg     Admin Date  12/28/2020 Action  Given Dose  60 mg Route  SubCUTAneous Administered By  Jenifer Childers, RN                1715 Pt tolerated treatment well. D/c home ambulatory in no distress. Pt aware of next appointment scheduled for 6/28/21.     Recent Results (from the past 24 hour(s))   RENAL FUNCTION PANEL    Collection Time: 12/28/20  4:25 PM   Result Value Ref Range    Sodium 139 136 - 145 mmol/L    Potassium 4.0 3.5 - 5.1 mmol/L    Chloride 105 97 - 108 mmol/L    CO2 30 21 - 32 mmol/L    Anion gap 4 (L) 5 - 15 mmol/L    Glucose 101 (H) 65 - 100 mg/dL    BUN 10 6 - 20 MG/DL    Creatinine 0.57 0.55 - 1.02 MG/DL    BUN/Creatinine ratio 18 12 - 20      GFR est AA >60 >60 ml/min/1.73m2    GFR est non-AA >60 >60 ml/min/1.73m2    Calcium 9.3 8.5 - 10.1 MG/DL    Phosphorus 4.2 2.6 - 4.7 MG/DL    Albumin 4.2 3.5 - 5.0 g/dL   MAGNESIUM    Collection Time: 12/28/20  4:25 PM   Result Value Ref Range    Magnesium 2.5 (H) 1.6 - 2.4 mg/dL   PHOSPHORUS    Collection Time: 12/28/20  4:25 PM   Result Value Ref Range    Phosphorus 4.2 2.6 - 4.7 MG/DL

## 2021-01-04 NOTE — PROGRESS NOTES
Please call patient to make a virtual appointment to discuss lab results. Nurse talked with her few weeks ago and  she was suppose to make an appointment.

## 2021-01-05 ENCOUNTER — OFFICE VISIT (OUTPATIENT)
Dept: PRIMARY CARE CLINIC | Age: 56
End: 2021-01-05
Payer: COMMERCIAL

## 2021-01-05 VITALS
SYSTOLIC BLOOD PRESSURE: 113 MMHG | HEIGHT: 61 IN | TEMPERATURE: 97.1 F | WEIGHT: 146.4 LBS | BODY MASS INDEX: 27.64 KG/M2 | HEART RATE: 62 BPM | OXYGEN SATURATION: 100 % | RESPIRATION RATE: 16 BRPM | DIASTOLIC BLOOD PRESSURE: 74 MMHG

## 2021-01-05 DIAGNOSIS — R10.11 RUQ ABDOMINAL PAIN: Primary | ICD-10-CM

## 2021-01-05 DIAGNOSIS — E78.5 HYPERLIPIDEMIA LDL GOAL <100: ICD-10-CM

## 2021-01-05 PROCEDURE — 99214 OFFICE O/P EST MOD 30 MIN: CPT | Performed by: FAMILY MEDICINE

## 2021-01-05 RX ORDER — SIMVASTATIN 20 MG/1
TABLET, FILM COATED ORAL
Qty: 90 TAB | Refills: 1 | Status: SHIPPED | OUTPATIENT
Start: 2021-01-05 | End: 2021-03-06 | Stop reason: SDUPTHER

## 2021-01-05 NOTE — PROGRESS NOTES
Chief Complaint   Patient presents with    Other     \"abdominal flutter\"     3 most recent PHQ Screens 1/5/2021   Little interest or pleasure in doing things Not at all   Feeling down, depressed, irritable, or hopeless Not at all   Total Score PHQ 2 0     Abuse Screening Questionnaire 1/5/2021   Do you ever feel afraid of your partner? N   Are you in a relationship with someone who physically or mentally threatens you? N   Is it safe for you to go home? Y     Visit Vitals  /74 (BP 1 Location: Left arm, BP Patient Position: Sitting)   Pulse 62   Temp 97.1 °F (36.2 °C) (Oral)   Resp 16   Ht 5' 1\" (1.549 m)   Wt 146 lb 6.4 oz (66.4 kg)   SpO2 100%   BMI 27.66 kg/m²     1. Have you been to the ER, urgent care clinic since your last visit? Hospitalized since your last visit?no    2. Have you seen or consulted any other health care providers outside of the [address] [place] [address] since your last visit? Include any pap smears or colon screening.  no

## 2021-01-05 NOTE — PROGRESS NOTES
Subjective:     Chief Complaint   Patient presents with    Other     \"abdominal flutter\"        She  is a 60y.o. year old female who presents today to discuss about her recent lab as well as with concerns. Patient c/o of fluttering sensation in her upper part of the abdomen. Denies any pain, N/V, diarrhea, constipation, weight loss. Patient is currently on Zocor 20 mg. Compliant. Cholesterol level worsened compare to last time. Lab Results   Component Value Date/Time    Cholesterol, total 236 (H) 2020 09:58 AM    HDL Cholesterol 64 2020 09:58 AM    LDL, calculated 145.8 (H) 2020 09:58 AM    VLDL, calculated 26.2 2020 09:58 AM    Triglyceride 131 2020 09:58 AM    CHOL/HDL Ratio 3.7 2020 09:58 AM         Pertinent items are noted in HPI. Objective:     Vitals:    21 0830   BP: 113/74   Pulse: 62   Resp: 16   Temp: 97.1 °F (36.2 °C)   TempSrc: Oral   SpO2: 100%   Weight: 146 lb 6.4 oz (66.4 kg)   Height: 5' 1\" (1.549 m)       Physical Examination: General appearance - alert, well appearing, and in no distress, oriented to person, place, and time and overweight  Mental status - alert, oriented to person, place, and time, normal mood, behavior, speech, dress, motor activity, and thought processes  Chest - clear to auscultation, no wheezes, rales or rhonchi, symmetric air entry  Heart - normal rate, regular rhythm, normal S1, S2, no murmurs, rubs, clicks or gallops  Abdomen - Mild tenderness noted over RUQ. no rebound tenderness noted  bowel sounds normal    Allergies   Allergen Reactions    Advil [Ibuprofen] Swelling     Lips only.  No resp sxs      Social History     Socioeconomic History    Marital status:      Spouse name: Not on file    Number of children: Not on file    Years of education: Not on file    Highest education level: Not on file   Tobacco Use    Smoking status: Former Smoker     Quit date: 10/24/1997     Years since quittin.2    Smokeless tobacco: Never Used   Substance and Sexual Activity    Alcohol use: Yes     Alcohol/week: 6.0 standard drinks     Types: 6 Glasses of wine per week    Drug use: No    Sexual activity: Yes     Partners: Male      No family history on file. Past Surgical History:   Procedure Laterality Date    HX BREAST REDUCTION Bilateral 10/28/2019    BILATERAL BREAST REDUCTION performed by Em Hebert MD at [facility] HX COLONOSCOPY      internal hemroid 2009    HX HEMORRHOIDECTOMY  2009    HX HERNIA REPAIR  2004      Past Medical History:   Diagnosis Date    Adverse effect of anesthesia     Urinary retention    GERD (gastroesophageal reflux disease)     Hypercholesteremia     Hypercholesterolemia     Osteoporosis       Current Outpatient Medications   Medication Sig Dispense Refill    simvastatin (ZOCOR) 20 mg tablet TAKE 1 TABLET BY MOUTH EVERY NIGHT 90 Tab 1    cholecalciferol, vitamin D3, (Vitamin D3) 50 mcg (2,000 unit) tab Take  by mouth. Indications: low vitamin D levels      pantoprazole (PROTONIX) 40 mg tablet Take 40 mg by mouth daily as needed.  denosumab (PROLIA) 60 mg/mL injection 1 mL by SubCUTAneous route every 6 months. 1 mL 0        Assessment/ Plan:   Diagnoses and all orders for this visit:    1. RUQ abdominal pain  -   Fluttering sensation noted X 2 weeks. Mild tenderness noted on RUQ. US ABD LTD; Future      USG showed:   Mildly heterogenous hepatic echogenicity may be related to hepatic steatosis or hepatic parenchymal change. No gall stone. Right renal cyst noted. Will monitor. Follow USG in 6 months. 2. Hyperlipidemia LDL goal <100  -    Cholesterol level went up. Counseled on diet and exercise. simvastatin (ZOCOR) 20 mg tablet; TAKE 1 TABLET BY MOUTH EVERY NIGHT           Medication risks/benefits/costs/interactions/alternatives discussed with patient.   Advised patient to call back or return to office if symptoms worsen/change/persist. If patient cannot reach us or should anything more severe/urgent arise he/she should proceed directly to the nearest emergency department. Discussed expected course/resolution/complications of diagnosis in detail with patient. Patient given a written after visit summary which includes her diagnoses, current medications and vitals. Patient expressed understanding with the diagnosis and plan. Follow-up and Dispositions    · Return in about 2 months (around 3/5/2021), or if symptoms worsen or fail to improve, for have fasting blood work done 2-3 days prior. , complete physical  and fasting blood work. Yolande Keller

## 2021-01-06 ENCOUNTER — HOSPITAL ENCOUNTER (OUTPATIENT)
Dept: ULTRASOUND IMAGING | Age: 56
Discharge: HOME | End: 2021-01-06
Attending: FAMILY MEDICINE
Payer: COMMERCIAL

## 2021-01-06 DIAGNOSIS — R10.11 RUQ ABDOMINAL PAIN: ICD-10-CM

## 2021-01-06 PROCEDURE — 76705 ECHO EXAM OF ABDOMEN: CPT

## 2021-01-09 NOTE — PROGRESS NOTES
Sent via my chart. Hi! Hope you are doing well! Ultrasound showed that you have fatty liver. Also found an incidental  4.1 x 3.2 cm right kidney cyst. No other concerning finding. We will have a follow up ultrasound in 6 months. Please work on avoiding fatty, macias food and continue work on daily exercise . Thanks.     Dr. Griselda Keepers

## 2021-02-22 ENCOUNTER — TELEPHONE (OUTPATIENT)
Dept: PRIMARY CARE CLINIC | Age: 56
End: 2021-02-22

## 2021-02-22 DIAGNOSIS — R73.03 PREDIABETES: ICD-10-CM

## 2021-02-22 DIAGNOSIS — E55.9 VITAMIN D DEFICIENCY: ICD-10-CM

## 2021-02-22 DIAGNOSIS — E78.5 HYPERLIPIDEMIA LDL GOAL <100: ICD-10-CM

## 2021-02-22 DIAGNOSIS — Z00.00 WELL ADULT ON ROUTINE HEALTH CHECK: Primary | ICD-10-CM

## 2021-02-22 NOTE — TELEPHONE ENCOUNTER
Patient would like to have labs done for her annual.    Please place lab orders. Patient wants a call back when orders are placed.

## 2021-02-24 DIAGNOSIS — Z00.00 WELL ADULT ON ROUTINE HEALTH CHECK: Primary | ICD-10-CM

## 2021-02-24 DIAGNOSIS — Z11.59 NEED FOR HEPATITIS B SCREENING TEST: ICD-10-CM

## 2021-02-24 DIAGNOSIS — Z11.59 NEED FOR HEPATITIS C SCREENING TEST: ICD-10-CM

## 2021-02-24 NOTE — TELEPHONE ENCOUNTER
Called and informed the patient labs were in the system and patient wants also Hepatitis panel added. Sent message to Dr Alessio Dos Santos to order.

## 2021-03-05 ENCOUNTER — OFFICE VISIT (OUTPATIENT)
Dept: PRIMARY CARE CLINIC | Age: 56
End: 2021-03-05
Payer: COMMERCIAL

## 2021-03-05 VITALS
DIASTOLIC BLOOD PRESSURE: 71 MMHG | RESPIRATION RATE: 18 BRPM | SYSTOLIC BLOOD PRESSURE: 109 MMHG | BODY MASS INDEX: 26.87 KG/M2 | TEMPERATURE: 98 F | OXYGEN SATURATION: 100 % | HEIGHT: 61 IN | HEART RATE: 72 BPM | WEIGHT: 142.3 LBS

## 2021-03-05 DIAGNOSIS — M81.0 OSTEOPOROSIS, UNSPECIFIED OSTEOPOROSIS TYPE, UNSPECIFIED PATHOLOGICAL FRACTURE PRESENCE: ICD-10-CM

## 2021-03-05 DIAGNOSIS — E53.8 B12 DEFICIENCY: ICD-10-CM

## 2021-03-05 DIAGNOSIS — E55.9 VITAMIN D DEFICIENCY: ICD-10-CM

## 2021-03-05 DIAGNOSIS — Z00.00 WELL ADULT HEALTH CHECK: Primary | ICD-10-CM

## 2021-03-05 DIAGNOSIS — E78.5 HYPERLIPIDEMIA LDL GOAL <100: ICD-10-CM

## 2021-03-05 DIAGNOSIS — Z23 NEED FOR TDAP VACCINATION: ICD-10-CM

## 2021-03-05 PROCEDURE — 99396 PREV VISIT EST AGE 40-64: CPT | Performed by: FAMILY MEDICINE

## 2021-03-05 PROCEDURE — 99213 OFFICE O/P EST LOW 20 MIN: CPT | Performed by: FAMILY MEDICINE

## 2021-03-05 RX ORDER — ASPIRIN 325 MG
50000 TABLET, DELAYED RELEASE (ENTERIC COATED) ORAL
Qty: 12 CAP | Refills: 1 | Status: SHIPPED | OUTPATIENT
Start: 2021-03-05 | End: 2021-07-15 | Stop reason: SDUPTHER

## 2021-03-05 NOTE — PROGRESS NOTES
Subjective:   60 y.o. female for Well Woman Check as well as follow up on her chronic conditions. Labs already done and reviewed with her today. She is currently on simvastatin 20 mg daily. LDL level has improved form last time. Vit d level is low. Hep panel is negative which she requested to have done. No risk factors. B 12 level is low 230. She exercises regularly and eats healthy. Her gyne and breast care is done elsewhere by her Ob-Gyne physician. 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 12 Cap 1    denosumab (PROLIA) 60 mg/mL injection 1 mL by SubCUTAneous route every 6 months. 1 mL 0    simvastatin (ZOCOR) 20 mg tablet TAKE 1 TABLET BY MOUTH EVERY NIGHT 90 Tab 1    pantoprazole (PROTONIX) 40 mg tablet Take 40 mg by mouth daily as needed. Allergies   Allergen Reactions    Advil [Ibuprofen] Swelling     Lips only.  No resp sxs     Past Medical History:   Diagnosis Date    Adverse effect of anesthesia     Urinary retention    GERD (gastroesophageal reflux disease)     Hypercholesteremia     Hypercholesterolemia     Osteoporosis         Lab Results   Component Value Date/Time    WBC 6.2 02/24/2021 12:00 PM    HGB 12.5 02/24/2021 12:00 PM    HCT 38.7 02/24/2021 12:00 PM    PLATELET 547 32/09/9432 12:00 PM    MCV 90.4 02/24/2021 12:00 PM     Lab Results   Component Value Date/Time    Cholesterol, total 209 (H) 02/24/2021 12:00 PM    HDL Cholesterol 66 02/24/2021 12:00 PM    LDL, calculated 121.8 (H) 02/24/2021 12:00 PM    Triglyceride 106 02/24/2021 12:00 PM    CHOL/HDL Ratio 3.2 02/24/2021 12:00 PM     Lab Results   Component Value Date/Time    ALT (SGPT) 37 02/24/2021 12:00 PM Alk. phosphatase 63 02/24/2021 12:00 PM    Bilirubin, direct <0.1 12/11/2020 09:58 AM    Bilirubin, total 0.5 02/24/2021 12:00 PM    Albumin 4.5 02/24/2021 12:00 PM    Protein, total 8.0 02/24/2021 12:00 PM    INR 1.0 10/24/2019 03:21 PM    Prothrombin time 10.3 10/24/2019 03:21 PM    PLATELET 019 98/70/6251 12:00 PM    Hepatitis B surface Ag <0.10 02/24/2021 12:00 PM     Lab Results   Component Value Date/Time    GFR est non-AA >60 02/24/2021 12:00 PM    GFR est AA >60 02/24/2021 12:00 PM    Creatinine 0.53 (L) 02/24/2021 12:00 PM    BUN 9 02/24/2021 12:00 PM    Sodium 138 02/24/2021 12:00 PM    Potassium 4.1 02/24/2021 12:00 PM    Chloride 103 02/24/2021 12:00 PM    CO2 28 02/24/2021 12:00 PM    Magnesium 2.5 (H) 12/28/2020 04:25 PM    Phosphorus 4.2 12/28/2020 04:25 PM    Phosphorus 4.2 12/28/2020 04:25 PM     Lab Results   Component Value Date/Time    TSH 0.674 02/24/2021 12:00 PM    TSH 0.585 02/13/2020 11:00 AM    T4, Free 1.41 07/16/2018 02:52 PM      Lab Results   Component Value Date/Time    Hemoglobin A1c 5.8 (H) 02/24/2021 12:00 PM       Lab Results   Component Value Date/Time    Vitamin D 25-Hydroxy 29.5 (L) 02/24/2021 12:00 PM       Lab Results   Component Value Date/Time    Vitamin B12 230 02/24/2021 12:00 PM       ROS: Feeling generally well. No TIA's or unusual headaches, no dysphagia. No prolonged cough. No dyspnea or chest pain on exertion. No abdominal pain, change in bowel habits, black or bloody stools. No urinary tract symptoms. No new or unusual musculoskeletal symptoms. Specific concerns today: refer to HPI. .    Objective: The patient appears well, alert, oriented x 3, in no distress. Visit Vitals  /71 (BP 1 Location: Left upper arm, BP Patient Position: Sitting, BP Cuff Size: Adult)   Pulse 72   Temp 98 °F (36.7 °C) (Oral)   Resp 18   Ht 5' 1\" (1.549 m)   Wt 142 lb 4.8 oz (64.5 kg)   SpO2 100%   BMI 26.89 kg/m²     ENT normal.  Neck supple. No adenopathy or thyromegaly. LAUREL. Lungs are clear, good air entry, no wheezes, rhonchi or rales. S1 and S2 normal, no murmurs, regular rate and rhythm. Abdomen soft without tenderness, guarding, mass or organomegaly. Extremities show no edema, normal peripheral pulses. Neurological is normal, no focal findings. Breast and Pelvic exams are deferred. Assessment/Plan:   Well Woman  lose weight, increase physical activity, follow low fat diet, follow low salt diet, continue present plan, routine labs ordered, call if any problems    ICD-10-CM ICD-9-CM    1. Well adult health check  Z00.00 V70.0    2. Hyperlipidemia LDL goal <100  E78.5 272.4    3. Vitamin D deficiency  E55.9 268.9 cholecalciferol (VITAMIN D3) (50,000 UNITS /1250 MCG) capsule   4. B12 deficiency  E53.8 266.2    5. Need for Tdap vaccination  Z23 V06.1 CANCELED: TETANUS, DIPHTHERIA TOXOIDS AND ACELLULAR PERTUSSIS VACCINE (TDAP), IN INDIVIDS. >=7, IM     Diagnoses and all orders for this visit:    1. Well adult health check     Lab results discussed with patient. Start B 12  1.000 mcg daily and f/u in 3 months. 2. Hyperlipidemia LDL goal <100  -   Stable. Continue  simvastatin (ZOCOR) 20 mg tablet; TAKE 1 TABLET BY MOUTH EVERY NIGHT    3. Vitamin D deficiency  -    Start  cholecalciferol (VITAMIN D3) (50,000 UNITS /1250 MCG) capsule; Take 1 Cap by mouth every seven (7) days. 4. B12 deficiency      Start b 12 1,000 iu daily and f/u in 3 months. 5. Need for Tdap vaccination      Pt will be getting COVID vaccine tomorrow. Will wait on that. 6. Osteoporosis, unspecified osteoporosis type, unspecified pathological fracture presence     Continue Prolia. Will check DEXA in one year. Follow-up and Dispositions    · Return in about 3 months (around 6/5/2021), or if symptoms worsen or fail to improve, for B 12, Vit D , lipid. Shaista Bagley the following changes in treatment are made: see above.   reviewed diet, exercise and weight control  cardiovascular risk and specific lipid/LDL goals reviewed  reviewed medications and side effects in detail

## 2021-03-05 NOTE — PROGRESS NOTES
Room 9    Identified pt with two pt identifiers(name and ). Reviewed record in preparation for visit and have obtained necessary documentation. All patient medications has been reviewed. No chief complaint on file. 3 most recent PHQ Screens 3/5/2021   Little interest or pleasure in doing things Not at all   Feeling down, depressed, irritable, or hopeless Not at all   Total Score PHQ 2 0     Abuse Screening Questionnaire 3/5/2021   Do you ever feel afraid of your partner? N   Are you in a relationship with someone who physically or mentally threatens you? N   Is it safe for you to go home? Y       Health Maintenance Due   Topic    DTaP/Tdap/Td series (1 - Tdap)    Shingrix Vaccine Age 49> (1 of 2)     Health Maintenance Review: Patient reminded of \"due or due soon\" health maintenance. I have asked the patient to contact his/her primary care provider (PCP) for follow-up on his/her health maintenance. There were no vitals filed for this visit. Wt Readings from Last 3 Encounters:   21 146 lb 6.4 oz (66.4 kg)   20 143 lb 6.4 oz (65 kg)   10/28/19 144 lb 6.4 oz (65.5 kg)     Temp Readings from Last 3 Encounters:   21 97.1 °F (36.2 °C) (Oral)   20 97 °F (36.1 °C)   20 97 °F (36.1 °C)     BP Readings from Last 3 Encounters:   21 113/74   20 127/75   20 127/73     Pulse Readings from Last 3 Encounters:   21 62   20 71   20 64       Coordination of Care Questionnaire:   1) Have you been to an emergency room, urgent care, or hospitalized since your last visit? No    2. Have seen or consulted any other health care provider since your last visit?    No

## 2021-03-06 RX ORDER — SIMVASTATIN 20 MG/1
TABLET, FILM COATED ORAL
Qty: 90 TAB | Refills: 1 | Status: SHIPPED | OUTPATIENT
Start: 2021-03-06 | End: 2021-07-15

## 2021-06-18 DIAGNOSIS — E78.5 HYPERLIPIDEMIA LDL GOAL <100: Primary | ICD-10-CM

## 2021-06-18 DIAGNOSIS — E53.8 B12 DEFICIENCY: ICD-10-CM

## 2021-06-18 DIAGNOSIS — E55.9 VITAMIN D DEFICIENCY: ICD-10-CM

## 2021-06-28 ENCOUNTER — HOSPITAL ENCOUNTER (OUTPATIENT)
Dept: INFUSION THERAPY | Age: 56
Discharge: HOME | End: 2021-06-28

## 2021-06-28 ENCOUNTER — TELEPHONE (OUTPATIENT)
Dept: PRIMARY CARE CLINIC | Age: 56
End: 2021-06-28

## 2021-06-28 VITALS
DIASTOLIC BLOOD PRESSURE: 80 MMHG | RESPIRATION RATE: 18 BRPM | HEART RATE: 62 BPM | TEMPERATURE: 98 F | SYSTOLIC BLOOD PRESSURE: 121 MMHG

## 2021-06-28 NOTE — TELEPHONE ENCOUNTER
Called the infusion center, the previous infusion paperwork filled out in December only had one dose, needed two. I had called and left a voicemail for the patient as well. Patient had to reschedule her infusion appointment. I have faxed over a new infusion form filled for two doses.

## 2021-06-28 NOTE — PROGRESS NOTES
Outpatient Infusion Center Short Visit Progress Note    1500 Pt admit to [facility] for Prolia ambulatory in stable condition. Assessment completed. No new concerns voiced. Labs were drawn on 6/24/21 and used for today's treatment however, orders from MD were for 1 prolia injection only. Attempted to reach office to no avail.  Rescheduling with the     Visit Vitals  /80 (BP 1 Location: Left upper arm, BP Patient Position: At rest)   Pulse 62   Temp 98 °F (36.7 °C)   Resp 18      Pt aware of next appointment scheduled for 7/9/21

## 2021-06-28 NOTE — TELEPHONE ENCOUNTER
----- Message from Arminda Ross sent at 6/28/2021  3:38 PM EDT -----  Regarding: /telephone  Contact: [phone]  General Message/Vendor Calls    Caller's first and last name: N/A      Reason for call: She is currently at the infusion center for her Prolia injection but they do not have the order.        Callback required yes/no and why: No      Best contact number(s):[phone]      Details to clarify the request: N/A      Arminda Ross

## 2021-07-09 ENCOUNTER — HOSPITAL ENCOUNTER (OUTPATIENT)
Dept: INFUSION THERAPY | Age: 56
Discharge: HOME | End: 2021-07-09

## 2021-07-09 ENCOUNTER — TELEPHONE (OUTPATIENT)
Dept: PRIMARY CARE CLINIC | Age: 56
End: 2021-07-09

## 2021-07-09 NOTE — TELEPHONE ENCOUNTER
Called and left message for patient to call office back , patient can schedule vv with dr ritchie ( double book if needed ) to discuss lab results

## 2021-07-09 NOTE — TELEPHONE ENCOUNTER
----- Message from Todd Hernandez MD sent at 7/7/2021  8:21 AM EDT -----  Please call patient to schedule an appointment to discuss lab results. I can do a virtual appointment with her today.

## 2021-07-12 NOTE — TELEPHONE ENCOUNTER
Left voicemail for patient to call office to schedule vv follow up with dr ritchie for her labs ok to double book patient

## 2021-07-14 ENCOUNTER — HOSPITAL ENCOUNTER (OUTPATIENT)
Dept: INFUSION THERAPY | Age: 56
Discharge: HOME | End: 2021-07-14
Payer: COMMERCIAL

## 2021-07-14 ENCOUNTER — TELEPHONE (OUTPATIENT)
Dept: PRIMARY CARE CLINIC | Age: 56
End: 2021-07-14

## 2021-07-14 VITALS
TEMPERATURE: 97.2 F | HEART RATE: 79 BPM | SYSTOLIC BLOOD PRESSURE: 121 MMHG | DIASTOLIC BLOOD PRESSURE: 78 MMHG | RESPIRATION RATE: 16 BRPM

## 2021-07-14 PROCEDURE — 96372 THER/PROPH/DIAG INJ SC/IM: CPT

## 2021-07-14 PROCEDURE — 74011250636 HC RX REV CODE- 250/636: Performed by: FAMILY MEDICINE

## 2021-07-14 RX ADMIN — DENOSUMAB 60 MG: 60 INJECTION SUBCUTANEOUS at 16:38

## 2021-07-14 NOTE — TELEPHONE ENCOUNTER
----- Message from [employer]da[employer] sent at 7/14/2021 12:01 PM EDT -----  Regarding: Dr. Virgilio Briceno  Patient return call    Caller's first and last name and relationship (if not the patient):pt      Best contact number(s):[phone]      Whose call is being returned:Nurse      Details to clarify the request:Returning call      Brenda Sibley

## 2021-07-14 NOTE — PROGRESS NOTES
Outpatient Infusion Center Progress Note    7221 Pt admit to [facility] for Prolia ambulatory in stable condition. Assessment completed. No new concerns voiced. Labs reviewed from 6/24/21; WNL for injection. Visit Vitals  /78   Pulse 79   Temp 97.2 °F (36.2 °C)   Resp 16       Medications:  Medications Administered     denosumab (PROLIA) injection 60 mg     Admin Date  07/14/2021 Action  Given Dose  60 mg Route  SubCUTAneous Administered By  Martha Kang RN            SC right arm      1640 Pt tolerated treatment well. D/c home ambulatory in no distress.  Pt aware of next appointment scheduled for   Future Appointments   Date Time Provider Shahbaz Nelly   7/15/2021  8:00 AM Deepak Wall MD [facility] BS AMB   8/9/2021  3:00 PM Yobany Wall MD [facility] BS AMB   1/12/2022  4:30 PM [facility]

## 2021-07-15 ENCOUNTER — OFFICE VISIT (OUTPATIENT)
Dept: PRIMARY CARE CLINIC | Age: 56
End: 2021-07-15
Payer: COMMERCIAL

## 2021-07-15 VITALS
BODY MASS INDEX: 27.41 KG/M2 | TEMPERATURE: 97.6 F | DIASTOLIC BLOOD PRESSURE: 64 MMHG | SYSTOLIC BLOOD PRESSURE: 100 MMHG | OXYGEN SATURATION: 100 % | HEART RATE: 60 BPM | RESPIRATION RATE: 16 BRPM | HEIGHT: 61 IN | WEIGHT: 145.2 LBS

## 2021-07-15 DIAGNOSIS — Z23 NEED FOR DIPHTHERIA-TETANUS-PERTUSSIS (TDAP) VACCINE: ICD-10-CM

## 2021-07-15 DIAGNOSIS — E55.9 VITAMIN D DEFICIENCY: ICD-10-CM

## 2021-07-15 DIAGNOSIS — E78.5 HYPERLIPIDEMIA LDL GOAL <100: Primary | ICD-10-CM

## 2021-07-15 PROCEDURE — 90715 TDAP VACCINE 7 YRS/> IM: CPT | Performed by: FAMILY MEDICINE

## 2021-07-15 PROCEDURE — 99214 OFFICE O/P EST MOD 30 MIN: CPT | Performed by: FAMILY MEDICINE

## 2021-07-15 PROCEDURE — 90471 IMMUNIZATION ADMIN: CPT | Performed by: FAMILY MEDICINE

## 2021-07-15 RX ORDER — ASPIRIN 325 MG
50000 TABLET, DELAYED RELEASE (ENTERIC COATED) ORAL
Qty: 12 CAPSULE | Refills: 1 | Status: SHIPPED | OUTPATIENT
Start: 2021-07-15

## 2021-07-15 RX ORDER — SIMVASTATIN 40 MG/1
TABLET, FILM COATED ORAL
Qty: 90 TABLET | Refills: 1 | Status: SHIPPED | OUTPATIENT
Start: 2021-07-15 | End: 2021-11-09

## 2021-07-15 NOTE — PROGRESS NOTES
Identified pt with two pt identifiers(name and ). Chief Complaint   Patient presents with    Abnormal Lab Results    Immunization/Injection     TETANUS NEEDED     Abdominal Pain     lower abd         3 most recent PHQ Screens 7/15/2021   Little interest or pleasure in doing things Not at all   Feeling down, depressed, irritable, or hopeless Not at all   Total Score PHQ 2 0        Vitals:    07/15/21 0819   BP: 100/64   Pulse: 60   Resp: 16   Temp: 97.6 °F (36.4 °C)   TempSrc: Temporal   SpO2: 100%   Weight: 145 lb 3.2 oz (65.9 kg)   Height: 5' 1\" (1.549 m)   PainSc:   0 - No pain       Health Maintenance Due   Topic    DTaP/Tdap/Td series (1 - Tdap)    Breast Cancer Screen Mammogram        1. Have you been to the ER, urgent care clinic since your last visit? Hospitalized since your last visit? No    2. Have you seen or consulted any other health care providers outside of the [address] [place] [address] since your last visit? Include any pap smears or colon screening.  No

## 2021-07-15 NOTE — PROGRESS NOTES
Subjective:     Chief Complaint   Patient presents with    Abnormal Lab Results    Immunization/Injection     TETANUS NEEDED     Abdominal Pain     lower abd         She  is a 60y.o. year old female with hx of HLD who presents to discuss about lab results and follow up on cholesterol, vit level. Would like to get Tdap vaccine. She is taking Zocor 20 mg. LDL level is uncontrolled. No medication side effects. Follows healthy diet. She is currently on vit d 5,000 iu daily. Notice a little pain in her lower abdomen only when she lays in bed. She does not feel pain otherwise. Otherwise she denies any constipation, diarrhea, urinary symptoms. She wondering if she need anything. Advised to continue monitor and follow up. Lab Results   Component Value Date/Time    Vitamin D 25-Hydroxy 30.9 06/24/2021 10:06 AM           Lab Results   Component Value Date/Time    Cholesterol, total 225 (H) 06/24/2021 10:06 AM    HDL Cholesterol 62 06/24/2021 10:06 AM    LDL, calculated 141.6 (H) 06/24/2021 10:06 AM    Triglyceride 107 06/24/2021 10:06 AM    CHOL/HDL Ratio 3.6 06/24/2021 10:06 AM     Lab Results   Component Value Date/Time    ALT (SGPT) 61 06/24/2021 10:06 AM    Alk.  phosphatase 70 06/24/2021 10:06 AM    Bilirubin, direct <0.1 12/11/2020 09:58 AM    Bilirubin, total 0.4 06/24/2021 10:06 AM    Albumin 4.2 06/24/2021 10:06 AM    Protein, total 8.0 06/24/2021 10:06 AM    INR 1.0 10/24/2019 03:21 PM    Prothrombin time 10.3 10/24/2019 03:21 PM    PLATELET 190 11/32/6859 12:00 PM    Hepatitis B surface Ag <0.10 02/24/2021 12:00 PM     Lab Results   Component Value Date/Time    GFR est non-AA >60 06/24/2021 10:06 AM    GFR est AA >60 06/24/2021 10:06 AM    Creatinine 0.57 06/24/2021 10:06 AM    BUN 11 06/24/2021 10:06 AM    Sodium 139 06/24/2021 10:06 AM    Potassium 5.3 (H) 06/24/2021 10:06 AM    Chloride 105 06/24/2021 10:06 AM    CO2 30 06/24/2021 10:06 AM    Magnesium 2.5 (H) 12/28/2020 04:25 PM    Phosphorus 4.2 2020 04:25 PM    Phosphorus 4.2 2020 04:25 PM               Pertinent items are noted in HPI. Objective:     Vitals:    07/15/21 0819   BP: 100/64   Pulse: 60   Resp: 16   Temp: 97.6 °F (36.4 °C)   TempSrc: Temporal   SpO2: 100%   Weight: 145 lb 3.2 oz (65.9 kg)   Height: 5' 1\" (1.549 m)       Physical Examination: General appearance - alert, well appearing, and in no distress, oriented to person, place, and time and overweight  Mental status - alert, oriented to person, place, and time, normal mood, behavior, speech, dress, motor activity, and thought processes  Chest - clear to auscultation, no wheezes, rales or rhonchi, symmetric air entry  Heart - normal rate, regular rhythm, normal S1, S2, no murmurs, rubs, clicks or gallops  Abdomen - soft, nontender, nondistended, no masses or organomegaly    Allergies   Allergen Reactions    Advil [Ibuprofen] Swelling     Lips only. No resp sxs      Social History     Socioeconomic History    Marital status:      Spouse name: Not on file    Number of children: Not on file    Years of education: Not on file    Highest education level: Not on file   Tobacco Use    Smoking status: Former Smoker     Quit date: 10/24/1997     Years since quittin.7    Smokeless tobacco: Never Used   Vaping Use    Vaping Use: Never used   Substance and Sexual Activity    Alcohol use: Yes     Alcohol/week: 6.0 standard drinks     Types: 6 Glasses of wine per week    Drug use: No    Sexual activity: Yes     Partners: Male     Social Determinants of Health     Financial Resource Strain:     Difficulty of Paying Living Expenses:    Food Insecurity:     Worried About Running Out of Food in the Last Year:     [address] in the Last Year:    Transportation Needs:     Lack of Transportation (Medical):      Lack of Transportation (Non-Medical):    Physical Activity:     Days of Exercise per Week:     Minutes of Exercise per Session:    Stress:     Feeling of Stress :    Social Connections:     Frequency of Communication with Friends and Family:     Frequency of Social Gatherings with Friends and Family:     Attends Nondenominational Services:     Active Member of Clubs or Organizations:     Attends Club or Organization Meetings:     Marital Status:       History reviewed. No pertinent family history. Past Surgical History:   Procedure Laterality Date    HX BREAST REDUCTION Bilateral 10/28/2019    BILATERAL BREAST REDUCTION performed by Valentin Ospina MD at [facility] HX COLONOSCOPY      internal hemroid 2009    HX HEMORRHOIDECTOMY  2009    [address]  2004      Past Medical History:   Diagnosis Date    Adverse effect of anesthesia     Urinary retention    GERD (gastroesophageal reflux disease)     Hypercholesteremia     Hypercholesterolemia     Osteoporosis       Current Outpatient Medications   Medication Sig Dispense Refill    simvastatin (ZOCOR) 20 mg tablet TAKE 1 TABLET BY MOUTH EVERY NIGHT 90 Tab 1    denosumab (PROLIA) 60 mg/mL injection 1 mL by SubCUTAneous route every 6 months. 1 mL 0    cholecalciferol (VITAMIN D3) (50,000 UNITS /1250 MCG) capsule Take 1 Cap by mouth every seven (7) days. (Patient not taking: Reported on 7/15/2021) 12 Cap 1    pantoprazole (PROTONIX) 40 mg tablet Take 40 mg by mouth daily as needed. (Patient not taking: Reported on 7/15/2021)          Assessment/ Plan:   Diagnoses and all orders for this visit:    1. Hyperlipidemia LDL goal <100  -    Increase  simvastatin (ZOCOR) 40 mg tablet; TAKE 1 TABLET BY MOUTH EVERY NIGHT    2. Need for diphtheria-tetanus-pertussis (Tdap) vaccine  -     TETANUS, DIPHTHERIA TOXOIDS AND ACELLULAR PERTUSSIS VACCINE (TDAP), IN INDIVIDS. >=7, IM    3. Vitamin D deficiency  -    Start  cholecalciferol (VITAMIN D3) (50,000 UNITS /1250 MCG) capsule; Take 1 Capsule by mouth every seven (7) days.            Medication risks/benefits/costs/interactions/alternatives discussed with patient. Advised patient to call back or return to office if symptoms worsen/change/persist. If patient cannot reach us or should anything more severe/urgent arise he/she should proceed directly to the nearest emergency department. Discussed expected course/resolution/complications of diagnosis in detail with patient. Patient given a written after visit summary which includes her diagnoses, current medications and vitals. Patient expressed understanding with the diagnosis and plan. Follow-up and Dispositions    · Return in about 3 months (around 10/15/2021), or if symptoms worsen or fail to improve.

## 2021-09-06 ENCOUNTER — PATIENT MESSAGE (OUTPATIENT)
Dept: PRIMARY CARE CLINIC | Age: 56
End: 2021-09-06

## 2021-11-03 ENCOUNTER — TELEPHONE (OUTPATIENT)
Dept: PRIMARY CARE CLINIC | Age: 56
End: 2021-11-03

## 2021-11-03 DIAGNOSIS — E78.5 HYPERLIPIDEMIA LDL GOAL <100: ICD-10-CM

## 2021-11-03 DIAGNOSIS — E55.9 VITAMIN D DEFICIENCY: Primary | ICD-10-CM

## 2021-11-03 DIAGNOSIS — R73.03 PREDIABETES: ICD-10-CM

## 2021-11-03 NOTE — TELEPHONE ENCOUNTER
----- Message from Aron Monterroso [id number] sent at 11/3/2021 11:27 AM EDT -----  Regarding: dr ritchie/ telephone  General Message/Vendor Calls    Caller's first and last name: pt      Reason for call: she would like a lab order to have all her panels checked       Callback required yes/no and why:      Best contact number(s): [phone]      Details to clarify the request:      Aron Monterroso [id number]

## 2021-11-03 NOTE — TELEPHONE ENCOUNTER
Spoke with pt to clarify request. She states she would like lipid panel, CMP, hba1c, and vit D level tested.  Pt would like to have these labs before her next acute visit on 11/9/21

## 2021-11-09 ENCOUNTER — HOSPITAL ENCOUNTER (OUTPATIENT)
Dept: GENERAL RADIOLOGY | Age: 56
Discharge: HOME | End: 2021-11-09
Attending: FAMILY MEDICINE
Payer: COMMERCIAL

## 2021-11-09 ENCOUNTER — OFFICE VISIT (OUTPATIENT)
Dept: PRIMARY CARE CLINIC | Age: 56
End: 2021-11-09
Payer: COMMERCIAL

## 2021-11-09 VITALS
HEART RATE: 72 BPM | WEIGHT: 150 LBS | DIASTOLIC BLOOD PRESSURE: 77 MMHG | SYSTOLIC BLOOD PRESSURE: 122 MMHG | OXYGEN SATURATION: 100 % | TEMPERATURE: 97.8 F | HEIGHT: 61 IN | RESPIRATION RATE: 16 BRPM | BODY MASS INDEX: 28.32 KG/M2

## 2021-11-09 DIAGNOSIS — R29.898 NECK TIGHTNESS: ICD-10-CM

## 2021-11-09 DIAGNOSIS — M50.30 DDD (DEGENERATIVE DISC DISEASE), CERVICAL: ICD-10-CM

## 2021-11-09 DIAGNOSIS — E55.9 VITAMIN D DEFICIENCY: ICD-10-CM

## 2021-11-09 DIAGNOSIS — E78.5 HYPERLIPIDEMIA LDL GOAL <100: Primary | ICD-10-CM

## 2021-11-09 PROCEDURE — 99214 OFFICE O/P EST MOD 30 MIN: CPT | Performed by: FAMILY MEDICINE

## 2021-11-09 PROCEDURE — 72040 X-RAY EXAM NECK SPINE 2-3 VW: CPT

## 2021-11-09 RX ORDER — METHYLPREDNISOLONE 4 MG/1
TABLET ORAL
Qty: 1 DOSE PACK | Refills: 0 | Status: SHIPPED | OUTPATIENT
Start: 2021-11-09 | End: 2022-06-07

## 2021-11-09 RX ORDER — ATORVASTATIN CALCIUM 40 MG/1
40 TABLET, FILM COATED ORAL
Qty: 90 TABLET | Refills: 0 | Status: SHIPPED | OUTPATIENT
Start: 2021-11-09 | End: 2022-02-08

## 2021-11-09 NOTE — PROGRESS NOTES
Sent via my chart. Ms. Manuel Trivedi,    Your xray neck showed there is advanced degenerative disc disease noted in C3-C7 vertebrae. There is no fracture or any other abnormality noted. If you would like to see a orthopedics specialist then I will be happy to refer you. Let me know. Dr. Adilson Hawkins.

## 2021-11-09 NOTE — PROGRESS NOTES
Identified pt with two pt identifiers(name and ). Chief Complaint   Patient presents with    Neck Pain     neck pain radiates to head going on for about 2-3 months     Labs    Vitamin D Deficiency        3 most recent PHQ Screens 2021   Little interest or pleasure in doing things Not at all   Feeling down, depressed, irritable, or hopeless Not at all   Total Score PHQ 2 0        Vitals:    21 1416   BP: 122/77   Pulse: 72   Resp: 16   Temp: 97.8 °F (36.6 °C)   TempSrc: Temporal   SpO2: 100%   Weight: 150 lb (68 kg)   Height: 5' 1\" (1.549 m)       Health Maintenance Due   Topic    Bone Densitometry     Breast Cancer Screen Mammogram     Flu Vaccine (1)       1. Have you been to the ER, urgent care clinic since your last visit? Hospitalized since your last visit? No    2. Have you seen or consulted any other health care providers outside of the [address] [place] [address] since your last visit? Include any pap smears or colon screening.  No

## 2021-11-09 NOTE — PROGRESS NOTES
Subjective:     Chief Complaint   Patient presents with    Neck Pain     neck pain radiates to head going on for about 2-3 months     Labs    Vitamin D Deficiency        She  is a 60y.o. year old female with hx of HLD who presents to discuss about lab results, follow up on cholesterol, vit d level as well as with a c/o neck stiffness. She reports that feeling neck muscle tightness and  vibration sensation in her back of the neck with a radiation to her back of the head. Denies any numbness or tingling. She is taking Zocor 40 mg which was increased in last visit. LDL level has improved but triglyceride level is significantly up from last time. No medication side effects. Follows healthy diet.     She is currently on vit d 5,000 iu daily. Lab Results   Component Value Date/Time    Cholesterol, total 222 (H) 11/08/2021 08:24 AM    HDL Cholesterol 68 11/08/2021 08:24 AM    LDL, calculated 110 (H) 11/08/2021 08:24 AM    VLDL, calculated 44 11/08/2021 08:24 AM    Triglyceride 220 (H) 11/08/2021 08:24 AM    CHOL/HDL Ratio 3.3 11/08/2021 08:24 AM     Lab Results   Component Value Date/Time    Vitamin D 25-Hydroxy 35.5 11/08/2021 08:24 AM       Lab Results   Component Value Date/Time    Sodium 136 11/08/2021 08:24 AM    Potassium 4.9 11/08/2021 08:24 AM    Chloride 107 11/08/2021 08:24 AM    CO2 26 11/08/2021 08:24 AM    Anion gap 3 (L) 11/08/2021 08:24 AM    Glucose 97 11/08/2021 08:24 AM    BUN 11 11/08/2021 08:24 AM    Creatinine 0.64 11/08/2021 08:24 AM    BUN/Creatinine ratio 17 11/08/2021 08:24 AM    GFR est AA >60 11/08/2021 08:24 AM    GFR est non-AA >60 11/08/2021 08:24 AM    Calcium 9.4 11/08/2021 08:24 AM    Bilirubin, total 0.7 11/08/2021 08:24 AM    Alk.  phosphatase 56 11/08/2021 08:24 AM    Protein, total 7.8 11/08/2021 08:24 AM    Albumin 4.2 11/08/2021 08:24 AM    Globulin 3.6 11/08/2021 08:24 AM    A-G Ratio 1.2 11/08/2021 08:24 AM    ALT (SGPT) 35 11/08/2021 08:24 AM    AST (SGOT) 25 2021 08:24 AM         Pertinent items are noted in HPI. Objective:     Vitals:    21 1416   BP: 122/77   Pulse: 72   Resp: 16   Temp: 97.8 °F (36.6 °C)   TempSrc: Temporal   SpO2: 100%   Weight: 150 lb (68 kg)   Height: 5' 1\" (1.549 m)       Physical Examination: General appearance - alert, well appearing, and in no distress, oriented to person, place, and time and overweight  Mental status - alert, oriented to person, place, and time, normal mood, behavior, speech, dress, motor activity, and thought processes  Neck - supple, no significant adenopathy, mild TTP over base of the neck. Stiffness noted with flexion, extension. Chest - clear to auscultation, no wheezes, rales or rhonchi, symmetric air entry  Heart - normal rate, regular rhythm, normal S1, S2, no murmurs, rubs, clicks or gallops  Neurological - alert, oriented, normal speech, no focal findings or movement disorder noted  Musculoskeletal - flexion of right thumb is restricted. Allergies   Allergen Reactions    Advil [Ibuprofen] Swelling     Lips only. No resp sxs      Social History     Socioeconomic History    Marital status:    Tobacco Use    Smoking status: Former Smoker     Quit date: 10/24/1997     Years since quittin.0    Smokeless tobacco: Never Used   Vaping Use    Vaping Use: Never used   Substance and Sexual Activity    Alcohol use: Yes     Alcohol/week: 6.0 standard drinks     Types: 6 Glasses of wine per week    Drug use: No    Sexual activity: Yes     Partners: Male      History reviewed. No pertinent family history.    Past Surgical History:   Procedure Laterality Date    HX BREAST REDUCTION Bilateral 10/28/2019    BILATERAL BREAST REDUCTION performed by Marquis Shani MD at [facility] HX COLONOSCOPY      internal hemroid 2009    HX HEMORRHOIDECTOMY  2009    HX HERNIA REPAIR  2004      Past Medical History:   Diagnosis Date    Adverse effect of anesthesia     Urinary retention    GERD (gastroesophageal reflux disease)     Hypercholesteremia     Hypercholesterolemia     Osteoporosis       Current Outpatient Medications   Medication Sig Dispense Refill    denosumab (PROLIA) 60 mg/mL injection 1 mL by SubCUTAneous route every 6 months. 1 mL 0    cholecalciferol (VITAMIN D3) (50,000 UNITS /1250 MCG) capsule Take 1 Capsule by mouth every seven (7) days. (Patient not taking: Reported on 11/9/2021) 12 Capsule 1    simvastatin (ZOCOR) 40 mg tablet TAKE 1 TABLET BY MOUTH EVERY NIGHT 90 Tablet 1    pantoprazole (PROTONIX) 40 mg tablet Take 40 mg by mouth daily as needed. (Patient not taking: Reported on 7/15/2021)          Assessment/ Plan:   Diagnoses and all orders for this visit:    1. Hyperlipidemia LDL goal <100  -    Uncontrolled. Switch to  atorvastatin (LIPITOR) 40 mg tablet; Take 1 Tablet by mouth nightly. Continue work on diet and exercise. 2. Neck tightness  -     XR SPINE CERV FLEX/EXT MAX 3 V; Future-  Xray neck showed there is advanced degenerative disc disease noted in C3-C7 vertebrae. There is no fracture or any other abnormality noted. Will consider referral to  orthopedics specialist depending on her symptoms.  -    Start  methylPREDNISolone (MEDROL DOSEPACK) 4 mg tablet; Follow pack instruction. Potential side effects discussed. 3. DDD (degenerative disc disease), cervical      Same as #2  4. Vitamin D deficiency       Well controlled with current vit d supplement. Medication risks/benefits/costs/interactions/alternatives discussed with patient. Advised patient to call back or return to office if symptoms worsen/change/persist. If patient cannot reach us or should anything more severe/urgent arise he/she should proceed directly to the nearest emergency department. Discussed expected course/resolution/complications of diagnosis in detail with patient.   Patient given a written after visit summary which includes her diagnoses, current medications and vitals. Patient expressed understanding with the diagnosis and plan. Follow-up and Dispositions    · Return in about 3 months (around 2/9/2022) for cholesterol.

## 2021-11-12 DIAGNOSIS — M50.30 DDD (DEGENERATIVE DISC DISEASE), CERVICAL: Primary | ICD-10-CM

## 2021-12-02 DIAGNOSIS — M50.30 DDD (DEGENERATIVE DISC DISEASE), CERVICAL: Primary | ICD-10-CM

## 2021-12-22 NOTE — ANESTHESIA POSTPROCEDURE EVALUATION
Procedure(s):  BILATERAL BREAST REDUCTION. general    Anesthesia Post Evaluation      Multimodal analgesia: multimodal analgesia not used between 6 hours prior to anesthesia start to PACU discharge  Patient location during evaluation: PACU  Patient participation: complete - patient participated  Level of consciousness: awake  Pain management: adequate  Airway patency: patent  Anesthetic complications: no  Cardiovascular status: acceptable, blood pressure returned to baseline and hemodynamically stable  Respiratory status: acceptable  Hydration status: acceptable  Post anesthesia nausea and vomiting:  controlled      Vitals Value Taken Time   /76 10/28/2019  3:50 PM   Temp 36.2 °C (97.1 °F) 10/28/2019  2:48 PM   Pulse 63 10/28/2019  3:54 PM   Resp 10 10/28/2019  3:54 PM   SpO2 97 % 10/28/2019  3:54 PM   Vitals shown include unvalidated device data. No

## 2022-01-04 ENCOUNTER — TRANSCRIBE ORDER (OUTPATIENT)
Dept: SCHEDULING | Age: 57
End: 2022-01-04

## 2022-01-04 DIAGNOSIS — M81.0 AGE-RELATED OSTEOPOROSIS WITHOUT CURRENT PATHOLOGICAL FRACTURE: Primary | ICD-10-CM

## 2022-01-04 DIAGNOSIS — Z12.31 OTHER SCREENING MAMMOGRAM: Primary | ICD-10-CM

## 2022-01-12 ENCOUNTER — HOSPITAL ENCOUNTER (OUTPATIENT)
Dept: INFUSION THERAPY | Age: 57
Discharge: HOME | End: 2022-01-12
Payer: COMMERCIAL

## 2022-01-12 VITALS
RESPIRATION RATE: 16 BRPM | DIASTOLIC BLOOD PRESSURE: 83 MMHG | TEMPERATURE: 96.8 F | SYSTOLIC BLOOD PRESSURE: 124 MMHG | HEART RATE: 66 BPM

## 2022-01-12 LAB
ALBUMIN SERPL-MCNC: 4.3 G/DL (ref 3.5–5)
ANION GAP SERPL CALC-SCNC: 9 MMOL/L (ref 5–15)
BUN SERPL-MCNC: 11 MG/DL (ref 6–20)
BUN/CREAT SERPL: 21 (ref 12–20)
CALCIUM SERPL-MCNC: 10 MG/DL (ref 8.5–10.1)
CHLORIDE SERPL-SCNC: 102 MMOL/L (ref 97–108)
CO2 SERPL-SCNC: 24 MMOL/L (ref 21–32)
CREAT SERPL-MCNC: 0.53 MG/DL (ref 0.55–1.02)
GLUCOSE SERPL-MCNC: 93 MG/DL (ref 65–100)
MAGNESIUM SERPL-MCNC: 2.2 MG/DL (ref 1.6–2.4)
PHOSPHATE SERPL-MCNC: 4.2 MG/DL (ref 2.6–4.7)
PHOSPHATE SERPL-MCNC: 4.2 MG/DL (ref 2.6–4.7)
POTASSIUM SERPL-SCNC: 3.6 MMOL/L (ref 3.5–5.1)
SODIUM SERPL-SCNC: 135 MMOL/L (ref 136–145)

## 2022-01-12 PROCEDURE — 84100 ASSAY OF PHOSPHORUS: CPT

## 2022-01-12 PROCEDURE — 83735 ASSAY OF MAGNESIUM: CPT

## 2022-01-12 PROCEDURE — 36415 COLL VENOUS BLD VENIPUNCTURE: CPT

## 2022-01-12 PROCEDURE — 96372 THER/PROPH/DIAG INJ SC/IM: CPT

## 2022-01-12 PROCEDURE — 74011250636 HC RX REV CODE- 250/636: Performed by: FAMILY MEDICINE

## 2022-01-12 PROCEDURE — 80069 RENAL FUNCTION PANEL: CPT

## 2022-01-12 RX ADMIN — DENOSUMAB 60 MG: 60 INJECTION SUBCUTANEOUS at 17:52

## 2022-01-12 NOTE — PROGRESS NOTES
[facility] Short Note                       Date: 2022    Name: Jacquelin Juarez    MRN: [id number]         : 1965    Treatment: Prolia injection SC right arm    [facility] COVID-19 SCREENING      The patient was asked the following questions and answered as documented below:    1. Do you have any symptoms of COVID-19? SOB, coughing, fever, or generally not feeling well? NO  2. Have you been exposed to COVID-19 recently? NO  3. Have you had any recent contact with family/friend that has a pending COVID test? NO      Follow Up: Proceed with treatment    Ms. Adeline Helms was assessed and education was provided. Ms. Dagmar Garcia vitals were reviewed prior to and after treatment. Patient Vitals for the past 12 hrs:   Temp Pulse Resp BP   22 1425 96.8 °F (36 °C) 66 16 124/83         Lab results were obtained and reviewed. Recent Results (from the past 12 hour(s))   RENAL FUNCTION PANEL    Collection Time: 22  4:22 PM   Result Value Ref Range    Sodium 135 (L) 136 - 145 mmol/L    Potassium 3.6 3.5 - 5.1 mmol/L    Chloride 102 97 - 108 mmol/L    CO2 24 21 - 32 mmol/L    Anion gap 9 5 - 15 mmol/L    Glucose 93 65 - 100 mg/dL    BUN 11 6 - 20 MG/DL    Creatinine 0.53 (L) 0.55 - 1.02 MG/DL    BUN/Creatinine ratio 21 (H) 12 - 20      GFR est AA >60 >60 ml/min/1.73m2    GFR est non-AA >60 >60 ml/min/1.73m2    Calcium 10.0 8.5 - 10.1 MG/DL    Phosphorus 4.2 2.6 - 4.7 MG/DL    Albumin 4.3 3.5 - 5.0 g/dL   MAGNESIUM    Collection Time: 22  4:22 PM   Result Value Ref Range    Magnesium 2.2 1.6 - 2.4 mg/dL       Medications given:      Ms. Adeline Helms tolerated the treatment without complaints. Ms. Adeline Helms was discharged from [place] [address] in stable condition at 1800.       Patient provided with AVS , which includes future appointment and written educational material.     Future Appointments   Date Time Provider Shahbaz Villegas   2022  9:00 AM SPT MAMMO 1 SPTMAMMO SPT   2/4/2022  9:30 AM SPT DEXA 1 SPTMAMMO SPT   2/24/2022  1:30 PM SRINATH Calderon Res BS AMB   7/13/2022  4:[facility]       Suze Chang RN  January 12, 2022  5:29 PM    Problem: Knowledge Deficit  Goal: *Verbalizes understanding of procedures and medications  Outcome: Progressing Towards Goal     Problem: Patient Education:  Go to Education Activity  Goal: Patient/Family Education  Outcome: Progressing Towards Goal

## 2022-01-14 DIAGNOSIS — K21.9 GASTROESOPHAGEAL REFLUX DISEASE, UNSPECIFIED WHETHER ESOPHAGITIS PRESENT: Primary | ICD-10-CM

## 2022-01-14 RX ORDER — PANTOPRAZOLE SODIUM 40 MG/1
40 TABLET, DELAYED RELEASE ORAL
Qty: 90 TABLET | Refills: 0 | Status: SHIPPED | OUTPATIENT
Start: 2022-01-14 | End: 2022-04-11 | Stop reason: SDUPTHER

## 2022-02-08 DIAGNOSIS — E78.5 HYPERLIPIDEMIA LDL GOAL <100: ICD-10-CM

## 2022-02-08 RX ORDER — ATORVASTATIN CALCIUM 40 MG/1
TABLET, FILM COATED ORAL
Qty: 90 TABLET | Refills: 0 | Status: SHIPPED | OUTPATIENT
Start: 2022-02-08 | End: 2022-05-09 | Stop reason: SDUPTHER

## 2022-02-12 ENCOUNTER — PATIENT MESSAGE (OUTPATIENT)
Dept: PRIMARY CARE CLINIC | Age: 57
End: 2022-02-12

## 2022-02-14 NOTE — TELEPHONE ENCOUNTER
LVM requesting callback to discuss her sxs further and encourage pt to go to urgent care for evaluation if has not already gone. [facility].

## 2022-02-22 ENCOUNTER — DOCUMENTATION ONLY (OUTPATIENT)
Dept: ORTHOPEDIC SURGERY | Age: 57
End: 2022-02-22

## 2022-02-22 NOTE — TELEPHONE ENCOUNTER
----- Message from Amita Villegas MD sent at 2/22/2022 11:55 AM EST -----  Regarding: FW: Antibiotics     ----- Message -----  From: Devin Edmondal  Sent: 2/21/2022   5:12 PM EST  To: [facility]  Subject: Juliana Silva,  I want to come in tomorrow Tuesday 2/22/22 if you could fit me into your busy schedule. I am still feeling under the weather. The fever is gone. Please let me know if I can come and see you.   Thank you again   Brian

## 2022-02-24 ENCOUNTER — OFFICE VISIT (OUTPATIENT)
Dept: ORTHOPEDIC SURGERY | Age: 57
End: 2022-02-24
Payer: COMMERCIAL

## 2022-02-24 VITALS — BODY MASS INDEX: 26.06 KG/M2 | WEIGHT: 138 LBS | HEIGHT: 61 IN

## 2022-02-24 DIAGNOSIS — M54.2 CHRONIC NECK PAIN: Primary | ICD-10-CM

## 2022-02-24 DIAGNOSIS — G89.29 CHRONIC NECK PAIN: Primary | ICD-10-CM

## 2022-02-24 PROCEDURE — 99204 OFFICE O/P NEW MOD 45 MIN: CPT | Performed by: PHYSICIAN ASSISTANT

## 2022-02-24 NOTE — PROGRESS NOTES
1. Have you been to the ER, urgent care clinic since your last visit? Hospitalized since your last visit? No    2. Have you seen or consulted any other health care providers outside of the [address] [place] [address] since your last visit? Include any pap smears or colon screening.  No    Chief Complaint   Patient presents with    Neck Pain

## 2022-02-28 NOTE — PROGRESS NOTES
Brian Burroughs (: 1965) is a 60 y.o. female patient here for evaluation of the following chief complaint(s):  Neck Pain         ASSESSMENT/PLAN:  Below is the assessment and plan developed based on review of pertinent history, physical exam, labs, studies, and medications. 1. Chronic neck pain  -     REFERRAL TO PHYSICAL THERAPY; Future      The patient's radiologic findings have been reviewed with her in detail today. She has a longstanding history of chronic neck pain with intermittent right upper extremity radicular symptoms. She does have some right hand pain and weakness which she feels may be unrelated. We have discussed various treatment options, and she would like to continue with over-the-counter medications at this time. She will start with a formal course of outpatient physical therapy. I have asked her to return for a follow-up visit in 2 months if her symptoms fail to improve. Will be a candidate for a MRI of the cervical spine at that time if her symptoms persist.  She may see one of our hand specialists for evaluation of her hand pain if she wishes. Return in about 2 months (around 2022), or if symptoms worsen or fail to improve. SUBJECTIVE/OBJECTIVE:  Brian Burroughs (: 1965) is a 60 y.o. female who presents today for the following:  Chief Complaint   Patient presents with    Neck Pain        HPI   Ms. Irene presents today as a new patient to the practice. She states that she has had a longstanding history of neck pain since . She states that she had no specific injury prior to the onset of her symptoms. She notes that her symptoms of been intermittent and are worsening progressively over time. She has had pain radiating along the left posterior aspect of the neck and into the left trapezius. She has no arm pain but does report occasional tingling in her right arm.   She does note some right hand pain and weakness, which she feels is unrelated to her neck. No difficulty with balance, falls, or dropping objects. She is taking only occasional Tylenol for relief of her pain. She has seen her primary care provider who performed x-rays, and referred her here today. IMAGING:  XR Results (most recent): I have personally reviewed these films  Results from Hospital Encounter encounter on 11/09/21    XR SPINE CERV FLEX/EXT MAX 3 V    Narrative  INDICATION: Neck tightness. Nusrat Meenakshi EXAM: CERVICAL SPINE. Lateral views of the cervical spine are obtained in neutral, flexion and  extension--3 images. FINDINGS: There is no apparent fracture. Vertebral alignment is preserved during  flexion and extension, although degree of flexion and extension is relatively  limited. There is no widening of the atlantodental interval. There is advanced  degenerative disc disease C3-C7. There is no prevertebral soft tissue swelling. Impression  Relatively limited flexion and extension but without  subluxation/instability. Cervical degenerative disc disease. No acute finding. MRI Results (most recent):  No results found for this or any previous visit. Allergies   Allergen Reactions    Advil [Ibuprofen] Swelling     Lips only. No resp sxs       Current Outpatient Medications   Medication Sig    atorvastatin (LIPITOR) 40 mg tablet TAKE 1 TABLET BY MOUTH EVERY NIGHT    pantoprazole (PROTONIX) 40 mg tablet Take 1 Tablet by mouth daily as needed (GERD).  cholecalciferol (VITAMIN D3) (50,000 UNITS /1250 MCG) capsule Take 1 Capsule by mouth every seven (7) days.  denosumab (PROLIA) 60 mg/mL injection 1 mL by SubCUTAneous route every 6 months.  methylPREDNISolone (MEDROL DOSEPACK) 4 mg tablet Follow pack instruction. (Patient not taking: Reported on 2/24/2022)     No current facility-administered medications for this visit.        Past Medical History:   Diagnosis Date    Adverse effect of anesthesia     Urinary retention    GERD (gastroesophageal reflux disease)     Hypercholesteremia     Hypercholesterolemia     Osteoporosis         Past Surgical History:   Procedure Laterality Date    HX BREAST REDUCTION Bilateral 10/28/2019    BILATERAL BREAST REDUCTION performed by Ruthie Tellez MD at [address] HX COLONOSCOPY      internal hemroid     HX HEMORRHOIDECTOMY      HX HERNIA REPAIR  2004       History reviewed. No pertinent family history. Social History     Tobacco Use    Smoking status: Former Smoker     Quit date: 10/24/1997     Years since quittin.3    Smokeless tobacco: Never Used   Substance Use Topics    Alcohol use: Yes     Alcohol/week: 6.0 standard drinks     Types: 6 Glasses of wine per week        Review of Systems   Constitutional: Negative. Respiratory: Negative. Cardiovascular: Negative. Gastrointestinal: Negative. Endocrine: Negative. Genitourinary: Negative. Musculoskeletal: Positive for neck pain. Skin: Negative. Allergic/Immunologic: Negative. Neurological: Negative for weakness and numbness. Hematological: Negative. Psychiatric/Behavioral: Negative. All other systems reviewed and are negative. No flowsheet data found. Vitals:  Ht 5' 1\" (1.549 m)   Wt 138 lb (62.6 kg)   BMI 26.07 kg/m²    Body mass index is 26.07 kg/m². Physical Exam    Neurologic  Overall Assessment of Muscle Strength and Tone reveals  Upper Extremities - Right Deltoid - 5/5. Left Deltoid - 5/5. Right Bicep - 5/5. Left Bicep - 5/5. Right Tricep - 5/5. Left Tricep - 5/5. Right Wrist Extensors - 5/5. Left Wrist Extensors - 5/5. Right Wrist Flexors - 5/5. Left Wrist Flexors - 5/5. Right Intrinsics - 5/5. Left Intrinsics - 5/5. General Assessment of Reflexes  Right Hand - Gonzales's sign is positive in the right hand. Left Hand - Gonzales's sign is negative in the left hand.   Reflexes (Dermatomes)  2/2 Normal - Left Bicep (C5-6), Left Tricep (C7-8), Left Brachioradialis (C5-6), Right Bicep (C5-6), Right Tricep (C7-8) and Right Brachioradialis (C5-6). Musculoskeletal  Global Assessment  Examination of related systems reveals - well-developed, well-nourished, in no acute distress, alert and oriented x 3 and normal coordination. Gait and Station - normal gait and station and normal posture. Spine/Ribs/Pelvis  Cervical Spine - Evaluation of related systems reveals - no lymphadenopathy and neurovascularly intact bilaterally. Inspection and Palpation - Tenderness - moderate and localized. Assessment of pain reveals the following findings: - Location - cervical area. ROJM - Flexion - 85 °. Right Lateral Flexion - 35 °. Left Lateral Flexion - 35 °. Extension - 70 °. Right Rotation - 80 °. Left Rotation - 80 °. Cervical Spine - Functional Testing - Foraminal Compression/Spurling's Test negative, Shoulder Depression Test negative, Upper Limb Tension Test negative. Lumbosacral Spine - Examination of the lumbosacral spine reveals - no tenderness to palpation, no pain, normal strength and tone, no laxity or crepitus and normal lumbosacral spine movements. Dr. Lavon Wilhelm was available for immediate consult during this encounter. An electronic signature was used to authenticate this note.   -- SRINATH Porras

## 2022-03-15 ENCOUNTER — HOSPITAL ENCOUNTER (OUTPATIENT)
Dept: MAMMOGRAPHY | Age: 57
Discharge: HOME | End: 2022-03-15
Attending: FAMILY MEDICINE
Payer: COMMERCIAL

## 2022-03-15 DIAGNOSIS — Z12.31 OTHER SCREENING MAMMOGRAM: ICD-10-CM

## 2022-03-15 DIAGNOSIS — M81.0 AGE-RELATED OSTEOPOROSIS WITHOUT CURRENT PATHOLOGICAL FRACTURE: ICD-10-CM

## 2022-03-15 PROCEDURE — 77080 DXA BONE DENSITY AXIAL: CPT

## 2022-03-15 PROCEDURE — 77063 BREAST TOMOSYNTHESIS BI: CPT

## 2022-03-17 ENCOUNTER — TELEPHONE (OUTPATIENT)
Dept: PRIMARY CARE CLINIC | Age: 57
End: 2022-03-17

## 2022-03-17 NOTE — TELEPHONE ENCOUNTER
Left message to call office. DEXA scan showed osteoporosis. Patient can schedule appointment if she has any questions.    Patient of Dr. Kyle Duong

## 2022-03-17 NOTE — PROGRESS NOTES
DEXA scan showed osteoporosis. Patient can schedule appointment if she has any questions.   Patient of Dr. Jorge Pina

## 2022-03-18 PROBLEM — R73.03 PREDIABETES: Status: ACTIVE | Noted: 2018-03-28

## 2022-03-19 PROBLEM — N64.81 PTOSIS OF BREAST: Status: ACTIVE | Noted: 2019-09-09

## 2022-03-19 PROBLEM — E78.5 HYPERLIPIDEMIA LDL GOAL <100: Status: ACTIVE | Noted: 2018-03-28

## 2022-03-19 PROBLEM — M81.0 OSTEOPOROSIS: Status: ACTIVE | Noted: 2018-03-28

## 2022-03-20 PROBLEM — N62 MACROMASTIA: Status: ACTIVE | Noted: 2019-10-28

## 2022-04-11 DIAGNOSIS — K21.9 GASTROESOPHAGEAL REFLUX DISEASE, UNSPECIFIED WHETHER ESOPHAGITIS PRESENT: ICD-10-CM

## 2022-04-17 RX ORDER — PANTOPRAZOLE SODIUM 40 MG/1
40 TABLET, DELAYED RELEASE ORAL
Qty: 90 TABLET | Refills: 0 | Status: SHIPPED | OUTPATIENT
Start: 2022-04-17 | End: 2022-09-15 | Stop reason: SDUPTHER

## 2022-05-09 DIAGNOSIS — E78.5 HYPERLIPIDEMIA LDL GOAL <100: ICD-10-CM

## 2022-05-10 RX ORDER — ATORVASTATIN CALCIUM 40 MG/1
40 TABLET, FILM COATED ORAL
Qty: 90 TABLET | Refills: 0 | Status: SHIPPED | OUTPATIENT
Start: 2022-05-10 | End: 2022-09-15 | Stop reason: SDUPTHER

## 2022-05-10 NOTE — TELEPHONE ENCOUNTER
PCP: Lorena Epperson MD    Last appt: 11/9/2021  Future Appointments   Date Time Provider Shahbaz Aguilloni   6/7/2022  2:30 PM Faith Bowles MD [facility] BS [facility]   7/13/2022  4:30 PM [address]       Requested Prescriptions     Pending Prescriptions Disp Refills    atorvastatin (LIPITOR) 40 mg tablet 90 Tablet 0     Sig: Take 1 Tablet by mouth nightly.          Other Comments:

## 2022-05-11 DIAGNOSIS — E55.9 VITAMIN D DEFICIENCY: ICD-10-CM

## 2022-05-11 DIAGNOSIS — Z00.00 ANNUAL PHYSICAL EXAM: Primary | ICD-10-CM

## 2022-06-07 ENCOUNTER — OFFICE VISIT (OUTPATIENT)
Dept: PRIMARY CARE CLINIC | Age: 57
End: 2022-06-07
Payer: COMMERCIAL

## 2022-06-07 VITALS
OXYGEN SATURATION: 98 % | RESPIRATION RATE: 16 BRPM | WEIGHT: 143.4 LBS | TEMPERATURE: 97.5 F | DIASTOLIC BLOOD PRESSURE: 72 MMHG | SYSTOLIC BLOOD PRESSURE: 111 MMHG | HEIGHT: 61 IN | HEART RATE: 64 BPM | BODY MASS INDEX: 27.08 KG/M2
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
## 2022-06-07 DIAGNOSIS — M54.2 NECK PAIN: ICD-10-CM

## 2022-06-07 DIAGNOSIS — M81.0 OSTEOPOROSIS, UNSPECIFIED OSTEOPOROSIS TYPE, UNSPECIFIED PATHOLOGICAL FRACTURE PRESENCE: ICD-10-CM

## 2022-06-07 DIAGNOSIS — R73.03 PREDIABETES: ICD-10-CM

## 2022-06-07 DIAGNOSIS — K21.9 GASTROESOPHAGEAL REFLUX DISEASE, UNSPECIFIED WHETHER ESOPHAGITIS PRESENT: ICD-10-CM

## 2022-06-07 DIAGNOSIS — R42 DIZZINESS: ICD-10-CM

## 2022-06-07 DIAGNOSIS — R26.9 GAIT ABNORMALITY: ICD-10-CM

## 2022-06-07 DIAGNOSIS — E55.9 VITAMIN D DEFICIENCY: ICD-10-CM

## 2022-06-07 DIAGNOSIS — E78.5 HYPERLIPIDEMIA LDL GOAL <100: Primary | ICD-10-CM

## 2022-06-07 PROCEDURE — 99214 OFFICE O/P EST MOD 30 MIN: CPT | Performed by: INTERNAL MEDICINE

## 2022-06-07 NOTE — PROGRESS NOTES
Shelley Mullins is a 59 y.o.  female and presents with     Chief Complaint   Patient presents with   [facility].     Pt wants to try prolia injections in the office. Pt went to [facility] last month  Pt fell backwards in the [employer] in [facility]. Patient reports that this is happened in the past  No LOC. Does feel dizzy at times  NO seizures. MOthers side has MS , and Parkinsons. Patient does have neck pain. Sometimes feels off balance when she walks      Had x-ray of the spine in the past that showed DJD      Past Medical History:   Diagnosis Date    Adverse effect of anesthesia     Urinary retention    GERD (gastroesophageal reflux disease)     Hypercholesteremia     Hypercholesterolemia     Osteoporosis      Past Surgical History:   Procedure Laterality Date    HX BREAST REDUCTION Bilateral 10/28/2019    BILATERAL BREAST REDUCTION performed by Анна Jarrell MD at [facility] HX COLONOSCOPY      internal hemroid 2009    HX HEMORRHOIDECTOMY  2009    HX HERNIA REPAIR  2004     Current Outpatient Medications   Medication Sig    denosumab (PROLIA) 60 mg/mL injection 1 mL by SubCUTAneous route once for 1 dose.  atorvastatin (LIPITOR) 40 mg tablet Take 1 Tablet by mouth nightly.  pantoprazole (PROTONIX) 40 mg tablet Take 1 Tablet by mouth daily as needed (GERD).  cholecalciferol (VITAMIN D3) (50,000 UNITS /1250 MCG) capsule Take 1 Capsule by mouth every seven (7) days.  denosumab (PROLIA) 60 mg/mL injection 1 mL by SubCUTAneous route every 6 months. No current facility-administered medications for this visit.      Health Maintenance   Topic Date Due    Flu Vaccine (Season Ended) 09/01/2022    Depression Screen  11/09/2022    A1C test (Diabetic or Prediabetic)  05/26/2023    Lipid Screen  05/26/2023    Colorectal Cancer Screening Combo  11/02/2023    Bone Densitometry  03/15/2024    Breast Cancer Screen Mammogram  03/15/2024    Cervical cancer screen 05/15/2024    DTaP/Tdap/Td series (2 - Td or Tdap) 07/15/2031    Hepatitis C Screening  Completed    Shingrix Vaccine Age 50>  Completed    COVID-19 Vaccine  Completed    Pneumococcal 0-64 years  Aged [employer] History   Administered Date(s) Administered    COVID-19, Pfizer Purple top, DILUTE for use, 12+ yrs, 30mcg/0.3mL dose 03/06/2021, 03/27/2021, 12/16/2021    Influenza Vaccine 09/15/2020    Influenza Vaccine (Quad) PF (>6 Mo Flulaval, Fluarix, and >3 Yrs Afluria, Fluzone 61506) 10/30/2019    Tdap 07/15/2021    Zoster Recombinant 12/05/2020, 02/24/2021     Patient's last menstrual period was 09/07/2015 (approximate). Allergies and Intolerances: Allergies   Allergen Reactions    Advil [Ibuprofen] Swelling     Lips only. No resp sxs       Family History:   No family history on file. Social History:   She  reports that she quit smoking about 24 years ago. Her smoking use included cigarettes. She started smoking about 40 years ago. She has a 3.75 pack-year smoking history. She has never used smokeless tobacco.  She  reports current alcohol use of about 6.0 standard drinks of alcohol per week.             Review of Systems:   General: negative for - chills, fatigue, fever, weight change  Psych: negative for - anxiety, depression, irritability or mood swings  ENT: negative for - headaches, hearing change, nasal congestion, oral lesions, sneezing or sore throat  Heme/ Lymph: negative for - bleeding problems, bruising, pallor or swollen lymph nodes  Endo: negative for - hot flashes, polydipsia/polyuria or temperature intolerance  Resp: negative for - cough, shortness of breath or wheezing  CV: negative for - chest pain, edema or palpitations  GI: negative for - abdominal pain, change in bowel habits, constipation, diarrhea or nausea/vomiting  : negative for - dysuria, hematuria, incontinence, pelvic pain or vulvar/vaginal symptoms  MSK: negative for - joint pain, joint swelling or muscle pain  Neuro: negative for - confusion, headaches, seizures or weakness  Derm: negative for - dry skin, hair changes, rash or skin lesion changes          Physical:   Vitals:   Vitals:    06/07/22 1434   BP: 111/72   Pulse: 64   Resp: 16   Temp: 97.5 °F (36.4 °C)   TempSrc: Temporal   SpO2: 98%   Weight: 143 lb 6.4 oz (65 kg)   Height: 5' 1\" (1.549 m)           Exam:   HEENT- atraumatic,normocephalic, awake, oriented, well nourished  Neck - supple,no enlarged lymph nodes, no JVD, no thyromegaly, limited range of motion at the neck  Chest- CTA, no rhonchi, no crackles  Heart- rrr, no murmurs / gallop/rub  Abdomen- soft,BS+,NT, no hepatosplenomegaly  Ext - no c/c/edema   Neuro- no focal deficits. Power 5/5 all extremities, Romberg sign positive  Skin - warm,dry, no obvious rashes. Review of Data:   LABS:   Lab Results   Component Value Date/Time    WBC 6.1 05/26/2022 09:25 AM    HGB 12.6 05/26/2022 09:25 AM    HCT 40.2 05/26/2022 09:25 AM    PLATELET 121 11/99/4138 09:25 AM     Lab Results   Component Value Date/Time    Sodium 137 05/26/2022 09:25 AM    Potassium 4.8 05/26/2022 09:25 AM    Chloride 104 05/26/2022 09:25 AM    CO2 31 05/26/2022 09:25 AM    Glucose 96 05/26/2022 09:25 AM    BUN 12 05/26/2022 09:25 AM    Creatinine 0.56 05/26/2022 09:25 AM     Lab Results   Component Value Date/Time    Cholesterol, total 215 (H) 05/26/2022 09:25 AM    HDL Cholesterol 69 05/26/2022 09:25 AM    LDL, calculated 115.2 (H) 05/26/2022 09:25 AM    Triglyceride 154 (H) 05/26/2022 09:25 AM     No components found for: GPT        Impression / Plan:        ICD-10-CM ICD-9-CM    1. Hyperlipidemia LDL goal <100  E78.5 272.4 LIPID PANEL      METABOLIC PANEL, COMPREHENSIVE   2. Gastroesophageal reflux disease, unspecified whether esophagitis present  K21.9 530.81    3. Osteoporosis, unspecified osteoporosis type, unspecified pathological fracture presence  M81.0 733.00 denosumab (PROLIA) 60 mg/mL injection   4.  Prediabetes R73.03 790.29 HEMOGLOBIN A1C WITH EAG   5. Vitamin D deficiency  E55.9 268.9 VITAMIN D, 25 HYDROXY   6. Fall, initial encounter  [address] XXXA E888.9 REFERRAL TO NEUROLOGY      MRI CERV SPINE WO CONT   7. Gait abnormality  R26.9 781.2 REFERRAL TO NEUROLOGY      MRI CERV SPINE WO CONT   8. Neck pain  M54.2 723.1 MRI CERV SPINE WO CONT   9. Dizziness  R42 780.4 MRI CERV SPINE WO CONT         Explained to patient risk benefits of the medications. Advised patient to stop meds if having any side effects. Pt verbalized understanding of the instructions. I have discussed the diagnosis with the patient and the intended plan as seen in the above orders. The patient has received an after-visit summary and questions were answered concerning future plans. I have discussed medication side effects and warnings with the patient as well. I have reviewed the plan of care with the patient, accepted their input and they are in agreement with the treatment goals. Reviewed plan of care. Patient has provided input and agrees with goals. Follow-up and Dispositions    · Return in about 14 weeks (around 9/13/2022).          Juve Cisse MD

## 2022-06-07 NOTE — PROGRESS NOTES
Chief Complaint   Patient presents with   [facility] Establish Care        Visit Vitals  /72 (BP 1 Location: Left upper arm, BP Patient Position: Sitting)   Pulse 64   Temp 97.5 °F (36.4 °C) (Temporal)   Resp 16   Ht 5' 1\" (1.549 m)   Wt 143 lb 6.4 oz (65 kg)   LMP 09/07/2015 (Approximate)   SpO2 98%   BMI 27.10 kg/m²        There are no preventive care reminders to display for this patient. 1. Have you been to the ER, urgent care clinic since your last visit? Hospitalized since your last visit? No    2. Have you seen or consulted any other health care providers outside of the [address] [place] [address] since your last visit? Include any pap smears or colon screening.  No

## 2022-06-15 ENCOUNTER — HOSPITAL ENCOUNTER (OUTPATIENT)
Dept: MRI IMAGING | Age: 57
Discharge: HOME | End: 2022-06-15
Attending: INTERNAL MEDICINE
Payer: COMMERCIAL

## 2022-06-15 DIAGNOSIS — M54.2 NECK PAIN: ICD-10-CM

## 2022-06-15 DIAGNOSIS — R26.9 GAIT ABNORMALITY: ICD-10-CM
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
## 2022-06-15 DIAGNOSIS — R42 DIZZINESS: ICD-10-CM

## 2022-06-15 PROCEDURE — 72141 MRI NECK SPINE W/O DYE: CPT

## 2022-06-16 DIAGNOSIS — M50.30 BULGING OF CERVICAL INTERVERTEBRAL DISC: ICD-10-CM

## 2022-06-16 DIAGNOSIS — M48.02 SPINAL STENOSIS OF CERVICAL REGION: Primary | ICD-10-CM

## 2022-06-17 ENCOUNTER — TELEPHONE (OUTPATIENT)
Dept: PRIMARY CARE CLINIC | Age: 57
End: 2022-06-17

## 2022-06-17 NOTE — TELEPHONE ENCOUNTER
Patient said her insurance company told her that she needs a Prior Auth for the [employer]. Patient asked if Dr. Leticia Faustin can please call the insurance company at [phone].    Patient said she needs the medicine

## 2022-06-20 NOTE — TELEPHONE ENCOUNTER
Spoke with patient and spoke with the pharmacy. rx is all set to ship to the office on 6/24. Patient needs to do a counseling with the pharmacist.   Chadd Jin with patient and advised her of this.  Also reminded her to contact the infusion center to cancel the appointment there since she would like it administered in office

## 2022-06-21 DIAGNOSIS — M81.0 OSTEOPOROSIS, UNSPECIFIED OSTEOPOROSIS TYPE, UNSPECIFIED PATHOLOGICAL FRACTURE PRESENCE: ICD-10-CM

## 2022-06-22 ENCOUNTER — TELEPHONE (OUTPATIENT)
Dept: PRIMARY CARE CLINIC | Age: 57
End: 2022-06-22

## 2022-06-22 NOTE — TELEPHONE ENCOUNTER
MRI results and referral faxed to Dr. Leanne Lira [facility]oped[facility].    Phone [phone]  Fax      [phone]

## 2022-06-22 NOTE — TELEPHONE ENCOUNTER
----- Message from Charity Chapman MD sent at 6/16/2022  8:50 PM EDT -----  MRI shows spinal stenosis and bulging disc.   I have referred you to spine surgeon

## 2022-07-06 ENCOUNTER — OFFICE VISIT (OUTPATIENT)
Dept: ORTHOPEDIC SURGERY | Age: 57
End: 2022-07-06
Payer: COMMERCIAL

## 2022-07-06 DIAGNOSIS — M54.2 NECK PAIN: ICD-10-CM

## 2022-07-06 DIAGNOSIS — M54.12 CERVICAL RADICULOPATHY: ICD-10-CM

## 2022-07-06 DIAGNOSIS — R68.84 JAW PAIN: ICD-10-CM

## 2022-07-06 DIAGNOSIS — M47.12 CERVICAL SPONDYLOSIS WITH MYELOPATHY: Primary | ICD-10-CM

## 2022-07-06 PROCEDURE — 99204 OFFICE O/P NEW MOD 45 MIN: CPT | Performed by: STUDENT IN AN ORGANIZED HEALTH CARE EDUCATION/TRAINING PROGRAM

## 2022-07-06 NOTE — PROGRESS NOTES
Brian Burroughs (: 1965) is a 59 y.o. female here for evaluation of the following chief complaint(s):  Neck Pain (MRI results)       ASSESSMENT/PLAN:  Below is the assessment and plan developed based on review of pertinent history, physical exam, labs, studies, and medications. 1. Cervical spondylosis with myelopathy  2. Neck pain  -     XR SPINE CERV PA LAT ODONT 3 V MAX; Future  -     REFERRAL TO PAIN MANAGEMENT; Future  3. Cervical radiculopathy  4. Jaw pain      Return in about 3 months (around 10/6/2022) for Routine Follow Up Non Op; Cervical facet injection. I would also like to CC this note her primary care physician to rule out jaw necrosis as a cause of her left jaw pain secondary to bisphosphonate therapy. I would like to follow patient for her new diagnosis of mild cervical myelopathy secondary to cervical spondylosis with frequent every 3-month visits. If she progresses we will consider repeat MRI and surgical conversation at that point in time. Thank you for allowing me to participate in the care of this patient. SUBJECTIVE/OBJECTIVE:  HPI     Patient presents with longstanding several year history of primary neck pain with left jaw pain and popping. She also has shoulder and trapezius pain. Secondarily she has bilateral left greater than right arm pain and numbness in the forearm and radial side of the hand. She also endorses frequent falls roughly 2-3 falls over the last 2 years and feeling of unsteady gait. She has difficulty with fine motor tasks including opening a jar. She denies any bowel or bladder symptoms. Relevant medical history of osteoporosis on bisphosphonate therapy. She is trialed therapy as well as cervical facet injections. Her last injection was approximately 3 years ago. They provided about 1 month of relief. She states that they were only a quick patch.     Chief Complaint   Patient presents with    Neck Pain     MRI results     Current Outpatient Medications   Medication Sig    denosumab (PROLIA) 60 mg/mL injection 1 mL by SubCUTAneous route every 6 months.  atorvastatin (LIPITOR) 40 mg tablet Take 1 Tablet by mouth nightly.  pantoprazole (PROTONIX) 40 mg tablet Take 1 Tablet by mouth daily as needed (GERD).  cholecalciferol (VITAMIN D3) (50,000 UNITS /1250 MCG) capsule Take 1 Capsule by mouth every seven (7) days. No current facility-administered medications for this visit. Past Medical History:   Diagnosis Date    Adverse effect of anesthesia     Urinary retention    GERD (gastroesophageal reflux disease)     Hypercholesteremia     Hypercholesterolemia     Osteoporosis      Past Surgical History:   Procedure Laterality Date    HX BREAST REDUCTION Bilateral 10/28/2019    BILATERAL BREAST REDUCTION performed by Anderson Hinton MD at [address] HX COLONOSCOPY      internal hemroid     HX HEMORRHOIDECTOMY      HX HERNIA REPAIR       History reviewed. No pertinent family history. Social History     Tobacco Use    Smoking status: Former Smoker     Packs/day: 0.25     Years: 15.00     Pack years: 3.75     Types: Cigarettes     Start date: 1982     Quit date: 10/24/1997     Years since quittin.7    Smokeless tobacco: Never Used   Vaping Use    Vaping Use: Never used   Substance Use Topics    Alcohol use:  Yes     Alcohol/week: 6.0 standard drinks     Types: 6 Glasses of wine per week    Drug use: No      Social History     Tobacco Use   Smoking Status Former Smoker    Packs/day: 0.25    Years: 15.00    Pack years: 3.75    Types: Cigarettes    Start date: 1982   [place] Quit date: 10/24/1997    Years since quittin.7   Smokeless Tobacco Never Used     Social History     Substance and Sexual Activity   Alcohol Use Yes    Alcohol/week: 6.0 standard drinks    Types: 6 Glasses of wine per week       Review of Systems  Red flag symptoms: None  Bowel/Bladder: None  Weakness: Bilateral lower extremity and left upper extremity  Sensory disturbance: Bilateral forearms and hands  Ambulation: Difficulty with frequent falls, ambulates without assist    LMP 09/07/2015 (Approximate)    Physical Exam  LOWER EXTREMITIES:  Gait: Normal heel toe but struggled through tandem gait  Motor: 5/5 strength with the exception of left quadriceps weakness 4+/5  Sensory: Intact to light touch in all dermatomes L4-S1  Reflexes: Normal to slightly diminished bilaterally L4 and S1  Pathological reflexes: None, no sustained clonus, downgoing Babinski  Integumentary: Normal  Palpation: No tenderness to palpation in the low back  Special tests: N/A    UPPER EXTREMITIES:  Motor: Some global weakness 4+/5 throughout but most notable in the left wrist extensors and wrist flexors 4/5  Sensory: Intact to light touch in all dermatomes  Reflexes: Normal and C5, diminished in C6 and C7 bilaterally  Pathological reflexes: Negative Gonzales's  Integumentary: Normal  Palpation: Cervical and trapezius tenderness to palpation  Special tests: N/A       IMAGING:    Radiology Read: Cervical MRI: multilevel degenerative changes and disc disease. Mild to moderate spinal canal stenosis from C4-C7. Multilevel neuroforaminal stenosis, most severe at C5-C6 on the right. XR Results (most recent):  Results from Appointment encounter on 07/06/22    XR SPINE CERV PA LAT ODONT 3 V MAX    Narrative  3 view cervical spine with decreased normal lordosis. Degenerative disc disease at C3-4, C4-5, C5-6 and C6-7 with anterior osteophyte complex. No instability noted on neutral or dynamic films. No other bony lesions noted. An electronic signature was used to authenticate this note.   -- Rika Leavitt DO

## 2022-07-06 NOTE — PROGRESS NOTES
1. Have you been to the ER, urgent care clinic since your last visit? Hospitalized since your last visit? No    2. Have you seen or consulted any other health care providers outside of the [address] [place] [address] since your last visit? Include any pap smears or colon screening. No    Chief Complaint   Patient presents with    Neck Pain     MRI results       She reports she had injections three years ago although she is unsure what location or provider who did them. She reports that she has completed a full 6 weeks of formal physical therapy about 6 months ago. Denies any medications for conservative treatment.

## 2022-07-06 NOTE — LETTER
7/6/2022    Patient: Sylvester Spurling   YOB: 1965   Date of Visit: 7/6/2022     Daisy Lind MD  [address] [place] [address] Andrew Sholacias [facility]  [facility]    Dear Daisy Lind MD,      Thank you for referring Ms. Brian Burroughs to [facility] for evaluation. My notes for this consultation are attached. If you have questions, please do not hesitate to call me. I look forward to following your patient along with you.       Sincerely,    Alesha Parks, DO

## 2022-07-08 ENCOUNTER — OFFICE VISIT (OUTPATIENT)
Dept: PRIMARY CARE CLINIC | Age: 57
End: 2022-07-08
Payer: COMMERCIAL

## 2022-07-08 DIAGNOSIS — M81.0 OSTEOPOROSIS, UNSPECIFIED OSTEOPOROSIS TYPE, UNSPECIFIED PATHOLOGICAL FRACTURE PRESENCE: Primary | ICD-10-CM

## 2022-07-08 PROCEDURE — 96401 CHEMO ANTI-NEOPL SQ/IM: CPT | Performed by: INTERNAL MEDICINE

## 2022-07-08 PROCEDURE — 96372 THER/PROPH/DIAG INJ SC/IM: CPT | Performed by: INTERNAL MEDICINE

## 2022-07-13 ENCOUNTER — APPOINTMENT (OUTPATIENT)
Dept: INFUSION THERAPY | Age: 57
End: 2022-07-13

## 2022-08-30 ENCOUNTER — TELEPHONE (OUTPATIENT)
Dept: PRIMARY CARE CLINIC | Age: 57
End: 2022-08-30

## 2022-08-30 NOTE — TELEPHONE ENCOUNTER
Patient has questions about her bill. She has been talking with Antoinette Mendoza and she was expecting to hear back from her by now.

## 2022-09-08 DIAGNOSIS — E53.8 B12 DEFICIENCY: Primary | ICD-10-CM

## 2022-09-09 NOTE — TELEPHONE ENCOUNTER
Spoke with patient's insurance. The bill she was received was the 30% that the insurance did not cover.    Explained this to the patient as well

## 2022-09-12 ENCOUNTER — OFFICE VISIT (OUTPATIENT)
Dept: NEUROLOGY | Age: 57
End: 2022-09-12
Payer: COMMERCIAL

## 2022-09-12 VITALS
SYSTOLIC BLOOD PRESSURE: 134 MMHG | HEART RATE: 80 BPM | OXYGEN SATURATION: 98 % | BODY MASS INDEX: 27.02 KG/M2 | RESPIRATION RATE: 20 BRPM | WEIGHT: 143 LBS | DIASTOLIC BLOOD PRESSURE: 82 MMHG

## 2022-09-12 DIAGNOSIS — R20.0 NUMBNESS AND TINGLING IN BOTH HANDS: ICD-10-CM

## 2022-09-12 DIAGNOSIS — R42 VERTIGO: ICD-10-CM

## 2022-09-12 DIAGNOSIS — G44.52 NEW DAILY PERSISTENT HEADACHE: Primary | ICD-10-CM

## 2022-09-12 DIAGNOSIS — R29.6 FALLS FREQUENTLY: ICD-10-CM

## 2022-09-12 DIAGNOSIS — R20.2 NUMBNESS AND TINGLING IN BOTH HANDS: ICD-10-CM

## 2022-09-12 PROCEDURE — 99204 OFFICE O/P NEW MOD 45 MIN: CPT | Performed by: PSYCHIATRY & NEUROLOGY

## 2022-09-12 NOTE — PROGRESS NOTES
Chief Complaint   Patient presents with    New Patient     Patient reports balance instability which results in frequent and multiple falls. Worse over the last few months. Also reports tingling and numbness in left arm. HISTORY OF PRESENT ILLNESS  Brian Villela is a 59 y.o. female who came in for neurological consultation requested by Dr. Santa Horta. For the past couple years she has had problems with her balance and sometimes will fall abruptly. She tends to lose her balance and feels that she is dizzy or someone is pushing her. This seems to be getting worse and recently she fell backwards. Denies any problems with hearing but occasionally will get ringing sounds in her ears. She has also been complaining of a headache, almost on a constant basis for the past 2 to 3 months. She describes this as a mild to moderate pressure-like sensation in the frontal areas, not associated with any photophobia, phonophobia. She does feel nauseous sometimes. Denies any numbness in her feet but feels numbness and tingling sensation in her hands and forearms. No significant neck pain or back pain. She had MRI of the cervical spine which showed mild scattered degenerative changes in the discs and joints. She works for [employer] and her job involves sitting at [employer]. She works in \. Past Medical History:   Diagnosis Date    Adverse effect of anesthesia     Urinary retention    GERD (gastroesophageal reflux disease)     Hypercholesteremia     Hypercholesterolemia     Osteoporosis      Current Outpatient Medications   Medication Sig    denosumab (PROLIA) 60 mg/mL injection 1 mL by SubCUTAneous route every 6 months. atorvastatin (LIPITOR) 40 mg tablet Take 1 Tablet by mouth nightly. pantoprazole (PROTONIX) 40 mg tablet Take 1 Tablet by mouth daily as needed (GERD). cholecalciferol (VITAMIN D3) (50,000 UNITS /1250 MCG) capsule Take 1 Capsule by mouth every seven (7) days. No current facility-administered medications for this visit. Allergies   Allergen Reactions    Advil [Ibuprofen] Swelling     Lips only. No resp sxs     Family History   Problem Relation Age of Onset    Heart Disease Mother     Cancer Father     Mult Sclerosis Sister     Throat cancer Brother      Social History     Tobacco Use    Smoking status: Former     Packs/day: 0.25     Years: 15.00     Pack years: 3.75     Types: Cigarettes     Start date: 1982     Quit date: 10/24/1997     Years since quittin.9    Smokeless tobacco: Never   Vaping Use    Vaping Use: Never used   Substance Use Topics    Alcohol use: Yes     Alcohol/week: 6.0 standard drinks     Types: 6 Glasses of wine per week     Comment: social    Drug use: No     Past Surgical History:   Procedure Laterality Date    HX BREAST REDUCTION Bilateral 10/28/2019    BILATERAL BREAST REDUCTION performed by Britt Gleason MD at [address] [place] [address]    HX COLONOSCOPY      internal hemroid 2009    HX HEMORRHOIDECTOMY  2009    HX HERNIA REPAIR           REVIEW OF SYSTEMS  Review of Systems - History obtained from the patient  Psychological ROS: negative  ENT ROS: negative  Hematological and Lymphatic ROS: negative  Endocrine ROS: negative  Respiratory ROS: no cough, shortness of breath, or wheezing  Cardiovascular ROS: no chest pain or dyspnea on exertion  Gastrointestinal ROS: no abdominal pain, change in bowel habits, or black or bloody stools  Genito-Urinary ROS: no dysuria, trouble voiding, or hematuria  Musculoskeletal ROS: negative  Dermatological ROS: negative      PHYSICAL EXAMINATION:    Visit Vitals  /82   Pulse 80   Resp 20   Wt 143 lb (64.9 kg)   SpO2 98%   BMI 27.02 kg/m²     General:  Well groomed individual in no acute distress. Neck: Supple, nontender, no bruits, no pain with resistance to active range of motion. Heart: Regular rate and rhythm. Normal S1S2.   Lungs:  Equal chest expansion, no cough, no wheeze  Musculoskeletal:  Extremities revealed no edema and had full range of motion of joints. Psych:  Good mood and bright affect    NEUROLOGICAL EXAMINATION:     Mental Status:   Alert and oriented to person, place, and time with recent and remote memory intact. Attention span and concentration are normal. Speech is fluent. Cranial Nerves:    II, III, IV, VI:  Visual acuity grossly intact. Visual fields are normal.    Pupils are equal, round, and reactive to light. Extra-ocular movements are full and fluid. Fundoscopic exam was benign, no ptosis or nystagmus. V-XII: Hearing is grossly intact. Facial features are symmetric, with normal sensation and strength. The palate rises symmetrically and the tongue protrudes midline. Sternocleidomastoids 5/5. Motor Examination: Normal tone, bulk, and strength. 5/5 muscle strength throughout. No cogwheel rigidity or clonus present. Sensory exam:  Normal throughout to pinprick, temperature, and vibration sense. Normal proprioception. Coordination:  Finger to nose and rapid arm movement testing was normal.   No resting or intention tremor    Gait and Station:  Steady while walking on toes, heels, and with tandem walking. Normal arm swing. No Rhomberg or pronator drift. No muscle wasting or fasiculations noted. Reflexes:  DTRs 2+ throughout. Toes downgoing. Bennie-Hallpike testing is positive    LABS / IMAGING  MRI Results (most recent):  Results from Hospital Encounter encounter on 06/15/22    MRI CERV SPINE WO CONT    Narrative  EXAM: MRI CERV SPINE WO CONT    INDICATION: Dx: Fall, initial encounter [place].Severe. XXXA (ICD-10-CM)]; Gait abnormality  [R26.9 (ICD-10-CM)]; Neck pain [M54.2 (ICD-10-CM)]; Dizziness [R42 (ICD-10-CM)].   Unspecified fall, initial encounter    COMPARISON: Cervical spine radiographs 11/9/2021    TECHNIQUE: MR imaging of the cervical spine was performed using the following  sequences: sagittal T1, T2, STIR;  axial T2, T1.    CONTRAST:  None. FINDINGS:    There is normal alignment of the cervical spine. Vertebral body heights are  maintained. Multilevel degenerative endplate osteophytes. Degenerative endplate  marrow edema at C5-C6. Mild atlantodental arthropathy. The craniocervical junction is intact. The course, caliber, and signal intensity  of the spinal cord are normal.    Right mastoid effusion. C2-C3: Facet arthropathy. Uncovertebral hypertrophy. No spinal stenosis. Moderate left foraminal stenosis. C3-C4: Disc bulge. Uncovertebral hypertrophy. Facet arthropathy. No spinal  stenosis. Moderate left and mild right foraminal stenosis    C4-C5: Disc bulge. Endplate osteophytes. Uncovertebral hypertrophy. Facet  arthropathy. Mild-moderate spinal stenosis. Mild right foraminal stenosis    C5-C6: Disc bulge. Endplate osteophytes. Uncovertebral hypertrophy, right worse  than left. Facet arthropathy. Mild-moderate spinal stenosis. Severe right  foraminal stenosis    C6-C7: Facet arthropathy. Disc bulge. Endplate osteophytes. Uncovertebral  hypertrophy, left worse in right. Mild spinal stenosis. Mild left foraminal  stenosis    C7-T1: No herniation or stenosis. Impression  Multilevel degenerative changes and disc disease. Mild-moderate spinal canal  stenoses from C4-C7. Multilevel neuroforaminal stenosis, most severe at C5-C6 on  the right.     Lab Results   Component Value Date/Time    WBC 6.1 05/26/2022 09:25 AM    HGB 12.6 05/26/2022 09:25 AM    HCT 40.2 05/26/2022 09:25 AM    PLATELET 894 93/59/8005 09:25 AM    MCV 93.5 05/26/2022 09:25 AM     Lab Results   Component Value Date/Time    Sodium 137 05/26/2022 09:25 AM    Potassium 4.8 05/26/2022 09:25 AM    Chloride 104 05/26/2022 09:25 AM    CO2 31 05/26/2022 09:25 AM    Anion gap 2 (L) 05/26/2022 09:25 AM    Glucose 96 05/26/2022 09:25 AM    BUN 12 05/26/2022 09:25 AM    Creatinine 0.56 05/26/2022 09:25 AM    BUN/Creatinine ratio 21 (H) 05/26/2022 09:25 AM GFR est AA >60 05/26/2022 09:25 AM    GFR est non-AA >60 05/26/2022 09:25 AM    Calcium 9.4 05/26/2022 09:25 AM    Bilirubin, total 0.6 05/26/2022 09:25 AM    Alk. phosphatase 61 05/26/2022 09:25 AM    Protein, total 7.5 05/26/2022 09:25 AM    Albumin 4.0 05/26/2022 09:25 AM    Globulin 3.5 05/26/2022 09:25 AM    A-G Ratio 1.1 05/26/2022 09:25 AM    ALT (SGPT) 32 05/26/2022 09:25 AM    AST (SGOT) 22 05/26/2022 09:25 AM     Lab Results   Component Value Date/Time    TSH 0.70 05/26/2022 09:25 AM     Lab Results   Component Value Date/Time    Vitamin B12 491 06/24/2021 10:06 AM       ASSESSMENT    ICD-10-CM ICD-9-CM    1. New daily persistent headache  G44.52 339.42 MRI BRAIN W WO CONT      2. Vertigo  R42 780.4 REFERRAL TO PHYSICAL THERAPY      3. Falls frequently  R29.6 V15.88 MRI BRAIN W WO CONT      4. Numbness and tingling in both hands  R20.0 782. 0 EMG NCV MOTOR WO F/WAVE PER NERVE    R20.2            DISCUSSION  Ms. Rowena Dockery appears to have vertigo of peripheral etiology. Vestibular maneuver precipitated her symptoms. A set of modified Epley exercises were performed in the office today. She was advised to go for a course of vestibular PT  Since she has been complaining of a daily headache for almost 3 months now without any clear history of primary headache disorders, I have recommended MRI scan of the brain to rule out any structural causes such as hydrocephalus, intracranial hypertension or an inflammatory/demyelinating condition  Schedule EMG/NCV to evaluate for cervical radiculopathy or carpal tunnel syndrome causing numbness in the hands. MRI of the cervical spine shows scattered degenerative changes with moderate foraminal stenosis at C5-C6 on the right  She had several questions about neurological condition such as Parkinson's, MS, ALS etc. and I have reassured her that my suspicion for any of these conditions is very low    Thank you for allowing me to participate in the care of Ms. Akbar Maskal. Please feel free to contact me if you have any questions. I will be happy to follow to follow her along with you.       Brii Barrera MD  Diplomate, American Board of Psychiatry & Neurology (Neurology)  Tammie Palumbo Board of Psychiatry & Neurology (Clinical Neurophysiology)  Diplomate, American Board of Electrodiagnostic Medicine

## 2022-09-15 ENCOUNTER — TELEPHONE (OUTPATIENT)
Dept: PRIMARY CARE CLINIC | Age: 57
End: 2022-09-15

## 2022-09-15 ENCOUNTER — OFFICE VISIT (OUTPATIENT)
Dept: PRIMARY CARE CLINIC | Age: 57
End: 2022-09-15
Payer: COMMERCIAL

## 2022-09-15 VITALS
HEART RATE: 60 BPM | HEIGHT: 61 IN | TEMPERATURE: 97.5 F | OXYGEN SATURATION: 100 % | SYSTOLIC BLOOD PRESSURE: 119 MMHG | RESPIRATION RATE: 18 BRPM | BODY MASS INDEX: 27.26 KG/M2 | DIASTOLIC BLOOD PRESSURE: 73 MMHG | WEIGHT: 144.4 LBS

## 2022-09-15 DIAGNOSIS — K21.9 GASTROESOPHAGEAL REFLUX DISEASE, UNSPECIFIED WHETHER ESOPHAGITIS PRESENT: ICD-10-CM

## 2022-09-15 DIAGNOSIS — M81.0 OSTEOPOROSIS, UNSPECIFIED OSTEOPOROSIS TYPE, UNSPECIFIED PATHOLOGICAL FRACTURE PRESENCE: ICD-10-CM

## 2022-09-15 DIAGNOSIS — Z23 ENCOUNTER FOR IMMUNIZATION: ICD-10-CM

## 2022-09-15 DIAGNOSIS — R61 NIGHT SWEATS: ICD-10-CM

## 2022-09-15 DIAGNOSIS — R42 VERTIGO: ICD-10-CM

## 2022-09-15 DIAGNOSIS — E55.9 VITAMIN D DEFICIENCY: Primary | ICD-10-CM

## 2022-09-15 DIAGNOSIS — E78.5 HYPERLIPIDEMIA LDL GOAL <100: ICD-10-CM

## 2022-09-15 PROCEDURE — 90686 IIV4 VACC NO PRSV 0.5 ML IM: CPT | Performed by: INTERNAL MEDICINE

## 2022-09-15 PROCEDURE — 99214 OFFICE O/P EST MOD 30 MIN: CPT | Performed by: INTERNAL MEDICINE

## 2022-09-15 PROCEDURE — 90471 IMMUNIZATION ADMIN: CPT | Performed by: INTERNAL MEDICINE

## 2022-09-15 RX ORDER — PANTOPRAZOLE SODIUM 40 MG/1
40 TABLET, DELAYED RELEASE ORAL
Qty: 90 TABLET | Refills: 1 | Status: SHIPPED | OUTPATIENT
Start: 2022-09-15

## 2022-09-15 RX ORDER — ACETAMINOPHEN 500 MG
2000 TABLET ORAL DAILY
Qty: 90 CAPSULE | Refills: 1 | Status: SHIPPED | OUTPATIENT
Start: 2022-09-15

## 2022-09-15 RX ORDER — ATORVASTATIN CALCIUM 40 MG/1
40 TABLET, FILM COATED ORAL
Qty: 90 TABLET | Refills: 1 | Status: SHIPPED | OUTPATIENT
Start: 2022-09-15

## 2022-09-15 NOTE — PROGRESS NOTES
Hina Phelps is a 59 y.o.  female and presents with     Chief Complaint   Patient presents with    Abnormal Lab Results     Discuss lab results     Immunization/Injection     Pt is here for follow up. Pt has Osteoporosis and takes Prolia  Pt has many concerns of lab results. She is cocnerned of  anion gap, BUN. Gets night sweats. Aslo feels dizzy. Already saw Neurology and orthopedics. Neurology felt that pt may have vertigo ad may need to see ENT    Past Medical History:   Diagnosis Date    Adverse effect of anesthesia     Urinary retention    GERD (gastroesophageal reflux disease)     Hypercholesteremia     Hypercholesterolemia     Osteoporosis      Past Surgical History:   Procedure Laterality Date    HX BREAST REDUCTION Bilateral 10/28/2019    BILATERAL BREAST REDUCTION performed by Bailee Clifford MD at [address]    HX COLONOSCOPY      internal hemroid 2009    HX HEMORRHOIDECTOMY  2009    HX HERNIA REPAIR  2004     Current Outpatient Medications   Medication Sig    [START ON 12/15/2022] denosumab (PROLIA) 60 mg/mL injection 1 mL by SubCUTAneous route every 6 months. cholecalciferol (VITAMIN D3) (2,000 UNITS /50 MCG) cap capsule Take 1 Capsule by mouth daily. atorvastatin (LIPITOR) 40 mg tablet Take 1 Tablet by mouth nightly. pantoprazole (PROTONIX) 40 mg tablet Take 1 Tablet by mouth daily as needed (GERD). cholecalciferol (VITAMIN D3) (50,000 UNITS /1250 MCG) capsule Take 1 Capsule by mouth every seven (7) days. (Patient not taking: Reported on 9/15/2022)     No current facility-administered medications for this visit.      Health Maintenance   Topic Date Due    COVID-19 Vaccine (4 - Booster for Pfizer series) 04/16/2022    Flu Vaccine (1) 09/01/2022    Depression Screen  11/09/2022    A1C test (Diabetic or Prediabetic)  09/12/2023    Lipid Screen  09/12/2023    Colorectal Cancer Screening Combo  11/02/2023    Bone Densitometry  03/15/2024    Breast Cancer Screen Mammogram 03/15/2024    Cervical cancer screen  05/15/2024    DTaP/Tdap/Td series (2 - Td or Tdap) 07/15/2031    Hepatitis C Screening  Completed    Shingrix Vaccine Age 50>  Completed    Pneumococcal 0-64 years  Aged Out     Immunization History   Administered Date(s) Administered    COVID-19, PFIZER PURPLE top, DILUTE for use, (age 15 y+), IM, 30mcg/0.3mL 03/06/2021, 03/27/2021, 12/16/2021    Influenza Vaccine 09/15/2020    Influenza, FLUARIX, FLULAVAL, Jeanne Peter (age 10 mo+) AND AFLURIA, (age 1 y+), PF, 0.5mL 10/30/2019    Tdap 07/15/2021    Zoster Recombinant 12/05/2020, 02/24/2021     Patient's last menstrual period was 09/07/2015 (approximate). Allergies and Intolerances: Allergies   Allergen Reactions    Advil [Ibuprofen] Swelling     Lips only. No resp sxs       Family History:   Family History   Problem Relation Age of Onset    Heart Disease Mother     Cancer Father     Mult Sclerosis Sister     Throat cancer Brother        Social History:   She  reports that she quit smoking about 24 years ago. Her smoking use included cigarettes. She started smoking about 40 years ago. She has a 3.75 pack-year smoking history. She has never used smokeless tobacco.  She  reports current alcohol use of about 6.0 standard drinks per week.             Review of Systems:   General: negative for - chills, fatigue, fever, weight change  Psych: negative for - anxiety, depression, irritability or mood swings  ENT: negative for - headaches, hearing change, nasal congestion, oral lesions, sneezing or sore throat  Heme/ Lymph: negative for - bleeding problems, bruising, pallor or swollen lymph nodes  Endo: negative for - hot flashes, polydipsia/polyuria or temperature intolerance  Resp: negative for - cough, shortness of breath or wheezing  CV: negative for - chest pain, edema or palpitations  GI: negative for - abdominal pain, change in bowel habits, constipation, diarrhea or nausea/vomiting  : negative for - dysuria, hematuria, incontinence, pelvic pain or vulvar/vaginal symptoms  MSK: negative for - joint pain, joint swelling or muscle pain  Neuro: negative for - confusion, headaches, seizures or weakness  Derm: negative for - dry skin, hair changes, rash or skin lesion changes          Physical:   Vitals:   Vitals:    09/15/22 1537   BP: 119/73   Pulse: 60   Resp: 18   Temp: 97.5 °F (36.4 °C)   TempSrc: Temporal   SpO2: 100%   Weight: 144 lb 6.4 oz (65.5 kg)   Height: 5' 1\" (1.549 m)           Exam:   HEENT- atraumatic,normocephalic, awake, oriented, well nourished, mild nystagmus  Neck - supple,no enlarged lymph nodes, no JVD, no thyromegaly  Chest- CTA, no rhonchi, no crackles  Heart- rrr, no murmurs / gallop/rub  Abdomen- soft,BS+,NT, no hepatosplenomegaly  Ext - no c/c/edema   Neuro- no focal deficits. Power 5/5 all extremities  Skin - warm,dry, no obvious rashes. Review of Data:   LABS:   Lab Results   Component Value Date/Time    WBC 6.1 05/26/2022 09:25 AM    HGB 12.6 05/26/2022 09:25 AM    HCT 40.2 05/26/2022 09:25 AM    PLATELET 761 42/91/7316 09:25 AM     Lab Results   Component Value Date/Time    Sodium 137 09/12/2022 10:32 AM    Potassium 4.4 09/12/2022 10:32 AM    Chloride 102 09/12/2022 10:32 AM    CO2 30 09/12/2022 10:32 AM    Glucose 103 (H) 09/12/2022 10:32 AM    BUN 14 09/12/2022 10:32 AM    Creatinine 0.55 09/12/2022 10:32 AM     Lab Results   Component Value Date/Time    Cholesterol, total 211 (H) 09/12/2022 10:32 AM    HDL Cholesterol 62 09/12/2022 10:32 AM    LDL, calculated 124.2 (H) 09/12/2022 10:32 AM    Triglyceride 124 09/12/2022 10:32 AM     No components found for: GPT        Impression / Plan:        ICD-10-CM ICD-9-CM    1. Vitamin D deficiency  E55.9 268.9 cholecalciferol (VITAMIN D3) (2,000 UNITS /50 MCG) cap capsule      2. Osteoporosis, unspecified osteoporosis type, unspecified pathological fracture presence  M81.0 733.00 denosumab (PROLIA) 60 mg/mL injection      3.  Hyperlipidemia LDL goal <100  E78.5 272.4 atorvastatin (LIPITOR) 40 mg tablet      4. Gastroesophageal reflux disease, unspecified whether esophagitis present  K21.9 530.81 pantoprazole (PROTONIX) 40 mg tablet      5. Night sweats  R61 780.8 XR CHEST PA LAT      6. Vertigo  R42 780.4 REFERRAL TO ENT-OTOLARYNGOLOGY      7. Encounter for immunization  Z23 V03.89           Explained to patient risk benefits of the medications. Advised patient to stop meds if having any side effects. Pt verbalized understanding of the instructions. I have discussed the diagnosis with the patient and the intended plan as seen in the above orders. The patient has received an after-visit summary and questions were answered concerning future plans. I have discussed medication side effects and warnings with the patient as well. I have reviewed the plan of care with the patient, accepted their input and they are in agreement with the treatment goals. Reviewed plan of care. Patient has provided input and agrees with goals. Follow-up and Dispositions    Return in about 3 months (around 12/15/2022).          Jamila Marina MD

## 2022-09-15 NOTE — PROGRESS NOTES
Chief Complaint   Patient presents with    Abnormal Lab Results     Discuss lab results         Visit Vitals  /73 (BP 1 Location: Left upper arm, BP Patient Position: Sitting)   Pulse 60   Temp 97.5 °F (36.4 °C) (Temporal)   Resp 18   Ht 5' 1\" (1.549 m)   Wt 144 lb 6.4 oz (65.5 kg)   LMP 09/07/2015 (Approximate)   SpO2 100%   BMI 27.28 kg/m²        Health Maintenance Due   Topic    COVID-19 Vaccine (4 - Booster for Pfizer series)    Flu Vaccine (1)        1. Have you been to the ER, urgent care clinic since your last visit? Hospitalized since your last visit? No    2. Have you seen or consulted any other health care providers outside of the [address] [place] [address] since your last visit? Include any pap smears or colon screening.  No

## 2022-09-20 ENCOUNTER — HOSPITAL ENCOUNTER (OUTPATIENT)
Dept: MRI IMAGING | Age: 57
Discharge: HOME | End: 2022-09-20
Attending: PSYCHIATRY & NEUROLOGY
Payer: COMMERCIAL

## 2022-09-20 VITALS — WEIGHT: 140 LBS | BODY MASS INDEX: 26.43 KG/M2 | HEIGHT: 61 IN

## 2022-09-20 DIAGNOSIS — G44.52 NEW DAILY PERSISTENT HEADACHE: ICD-10-CM

## 2022-09-20 DIAGNOSIS — R29.6 FALLS FREQUENTLY: ICD-10-CM

## 2022-09-20 PROCEDURE — 74011250636 HC RX REV CODE- 250/636: Performed by: PSYCHIATRY & NEUROLOGY

## 2022-09-20 PROCEDURE — 70553 MRI BRAIN STEM W/O & W/DYE: CPT

## 2022-09-20 PROCEDURE — A9576 INJ PROHANCE MULTIPACK: HCPCS | Performed by: PSYCHIATRY & NEUROLOGY

## 2022-09-20 RX ADMIN — GADOTERIDOL 12 ML: 279.3 INJECTION, SOLUTION INTRAVENOUS at 18:39

## 2022-09-26 ENCOUNTER — OFFICE VISIT (OUTPATIENT)
Dept: NEUROLOGY | Age: 57
End: 2022-09-26
Payer: COMMERCIAL

## 2022-09-26 DIAGNOSIS — R42 VERTIGO: ICD-10-CM

## 2022-09-26 DIAGNOSIS — M25.50 PAIN IN JOINTS: ICD-10-CM

## 2022-09-26 DIAGNOSIS — M79.604 PAIN IN BOTH LOWER EXTREMITIES: ICD-10-CM

## 2022-09-26 DIAGNOSIS — M79.605 PAIN IN BOTH LOWER EXTREMITIES: ICD-10-CM

## 2022-09-26 DIAGNOSIS — M79.601 PAIN IN BOTH UPPER EXTREMITIES: ICD-10-CM

## 2022-09-26 DIAGNOSIS — R20.0 EXTREMITY NUMBNESS: ICD-10-CM

## 2022-09-26 DIAGNOSIS — H93.19 TINNITUS, UNSPECIFIED LATERALITY: ICD-10-CM

## 2022-09-26 DIAGNOSIS — M79.602 PAIN IN BOTH UPPER EXTREMITIES: ICD-10-CM

## 2022-09-26 DIAGNOSIS — M25.50 ARTHRALGIA, UNSPECIFIED JOINT: Primary | ICD-10-CM

## 2022-09-26 PROCEDURE — 95913 NRV CNDJ TEST 13/> STUDIES: CPT | Performed by: PSYCHIATRY & NEUROLOGY

## 2022-09-26 PROCEDURE — 95886 MUSC TEST DONE W/N TEST COMP: CPT | Performed by: PSYCHIATRY & NEUROLOGY

## 2022-09-26 NOTE — PROGRESS NOTES
[address] [facility]  [address], [facility] [address]  Phone [phone] Fax [phone]  Test Date:  2022    Patient: Farhad Cheng : 1965 Physician: Ximena Khan MD   Sex: Male Height: 5' 1\" Ref Phys: Ximena Khan MD   ID#: [id number]  Weight: 140 lbs. Technician: Marylou Garber     Patient Complaints:      Patient History / Exam:  61-year-old female who is being evaluated for numbness and tingling sensation in all extremities. She also gets pain within the joints of her hands and feet. NCV & EMG Findings:  All nerve conduction studies were within normal limits. All examined muscles (as indicated in the following table) showed no evidence of electrical instability.       Impression:    The electrodiagnostic testing is normal.  No evidence to suggest an entrapment mononeuropathy, peripheral neuropathy or a radiculopathy on the right      ___________________________  Ximena Khan MD        Nerve Conduction Studies  Anti Sensory Summary Table     Stim Site NR Peak (ms) Norm Peak (ms) P-T Amp (µV) Norm P-T Amp Onset (ms) Site1 Site2 Delta-P (ms) Dist (cm) Tai (m/s) Norm Tai (m/s)   Left Median Anti Sensory (2nd Digit)  32.2°C   Wrist    3.1 <3.6 44.9 >10 2.3 Wrist 2nd Digit 3.1 14.0 45 >39   Right Median Anti Sensory (2nd Digit)  32.3°C   Wrist    3.0 <3.6 14.4 >10 2.6 Wrist 2nd Digit 3.0 14.0 47 >39   Left Radial Anti Sensory (Base 1st Digit)  32.7°C   Wrist    2.2 <3.1 46.4  1.8 Wrist Base 1st Digit 2.2 0.0     Right Radial Anti Sensory (Base 1st Digit)  32.2°C   Wrist    1.8 <3.1 69.2  1.2 Wrist Base 1st Digit 1.8 0.0     Right Sup Peroneal Anti Sensory (Ant Lat Mall)  33°C   14 cm    2.8 <4.4 27.9 >5.0 2.2 14 cm Ant Lat Mall 2.8 14.0 50 >32   Right Sural Anti Sensory (Lat Mall)  32.2°C   Calf    3.6 <4.0 29.6 >5.0 3.1 Calf Lat Mall 3.6 14.0 39 >35   Left Ulnar Anti Sensory (5th Digit)  32.6°C Wrist    3.2 <3.7 16.0 >15.0 2.4 Wrist 5th Digit 3.2 14.0 44 >38   Right Ulnar Anti Sensory (5th Digit)  32.3°C   Wrist    2.5 <3.7 41.5 >15.0 1.9 Wrist 5th Digit 2.5 14.0 56 >38     Motor Summary Table     Stim Site NR Onset (ms) Norm Onset (ms) O-P Amp (mV) Norm O-P Amp Site1 Site2 Delta-0 (ms) Dist (cm) Tai (m/s) Norm Tai (m/s)   Left Median Motor (Abd Poll Brev)  32.8°C   Wrist    2.7 <4.2 6.7 >5 Elbow Wrist 3.6 22.0 61 >50   Elbow    6.3  4.9          Right Median Motor (Abd Poll Brev)  32.2°C   Wrist    2.7 <4.2 7.2 >5 Elbow Wrist 4.4 22.0 50 >50   Elbow    7.1  6.2          Right Peroneal Motor (Ext Dig Brev)  33.3°C   Ankle    2.7 <6.1 5.4 >2.5 B Fib Ankle 6.7 33.0 49 >38   B Fib    9.4  4.5  Poplt B Fib 1.5 10.0 67 >40   Poplt    10.9  3.7          Right Tibial Motor (Abd Levy Brev)  33.6°C   Ankle    3.4 <6.1 6.1 >3.0 Knee Ankle 7.0 37.0 53 >35   Knee    10.4  4.6          Left Ulnar Motor (Abd Dig Minimi)  33.5°C   Wrist    2.7 <4.2 8.6 >3 B Elbow Wrist 2.5 17.0 68 >53   B Elbow    5.2  7.7  A Elbow B Elbow 1.8 10.0 56 >53   A Elbow    7.0  7.6          Right Ulnar Motor (Abd Dig Minimi)  32.6°C   Wrist    2.3 <4.2 8.0 >3 B Elbow Wrist 2.5 17.0 68 >53   B Elbow    4.8  8.0  A Elbow B Elbow 1.9 10.0 53 >53   A Elbow    6.7  7.9            Comparison Summary Table     Stim Site NR Peak (ms) Norm Peak (ms) P-T Amp (µV) Site1 Site2 Delta-P (ms) Norm Delta (ms)   Left Median/Ulnar Palm Comparison (Wrist - 8cm)  33.2°C   Median Palm    1.7 <2.5 15.9 Median Palm Ulnar Palm 0.1 <0.3   Ulnar Palm    1.6 <2.5 14.6       Right Median/Ulnar Palm Comparison (Wrist - 8cm)  32.4°C   Median Palm    1.9 <2.5 41.1 Median Palm Ulnar Palm 0.3 <0.3   Ulnar Palm    1.6 <2.5 18.0         EMG     Side Muscle Nerve Root Ins Act Fibs Psw Amp Dur Poly Recrt Int Tompkins Amairani Comment   Left Abd Poll Brev Median C8-T1 Nml Nml Nml Nml Nml 0 Nml Nml    Left 1stDorInt Ulnar C8-T1 Nml Nml Nml Nml Nml 0 Nml Nml    Left BrachioRad Radial C5-6 Nml Nml Nml Nml Nml 0 Nml Nml    Left PronatorTeres Median C6-7 Nml Nml Nml Nml Nml 0 Nml Nml    Left Biceps Musculocut C5-6 Nml Nml Nml Nml Nml 0 Nml Nml    Left Triceps Radial C6-7-8 Nml Nml Nml Nml Nml 0 Nml Nml    Left Deltoid Axillary C5-6 Nml Nml Nml Nml Nml 0 Nml Nml    Left Cervical Parasp Mid Rami C4-6 Nml Nml Nml         Right Abd Poll Brev Median C8-T1 Nml Nml Nml Nml Nml 0 Nml Nml    Right 1stDorInt Ulnar C8-T1 Nml Nml Nml Nml Nml 0 Nml Nml    Right BrachioRad Radial C5-6 Nml Nml Nml Nml Nml 0 Nml Nml    Right PronatorTeres Median C6-7 Nml Nml Nml Nml Nml 0 Nml Nml    Right Biceps Musculocut C5-6 Nml Nml Nml Nml Nml 0 Nml Nml    Right Triceps Radial C6-7-8 Nml Nml Nml Nml Nml 0 Nml Nml    Right Deltoid Axillary C5-6 Nml Nml Nml Nml Nml 0 Nml Nml    Right Cervical Parasp Mid Rami C4-6 Nml Nml Nml         Right AntTibialis Dp Br Peronel L4-5 Nml Nml Nml Nml Nml 0 Nml Nml    Right Peroneus Long Sup Br Peronel L5-S1 Nml Nml Nml Nml Nml 0 Nml Nml    Right Gastroc Tibial S1-2 Nml Nml Nml Nml Nml 0 Nml Nml    Right Flex Dig Long Tibial L5-S2 Nml Nml Nml Nml Nml 0 Nml Nml    Right VastusLat Femoral L2-4 Nml Nml Nml Nml Nml 0 Nml Nml    Right Lumbo Parasp Low Rami L5-S1 Nml Nml Nml               Waveforms:

## 2022-10-14 ENCOUNTER — APPOINTMENT (OUTPATIENT)
Dept: PHYSICAL THERAPY | Age: 57
End: 2022-10-14

## 2022-11-09 ENCOUNTER — TELEPHONE (OUTPATIENT)
Dept: PRIMARY CARE CLINIC | Age: 57
End: 2022-11-09

## 2022-11-09 NOTE — TELEPHONE ENCOUNTER
Patient is calling because she has sent  messages on [employer] and wants to know if  will be prescribing her prolia injection, if he still wants her to have it, or did you want her to take Fosamax.        Lov was 9/15/22

## 2022-11-10 NOTE — TELEPHONE ENCOUNTER
Called [facility] pharmacy and they are going to ship out the prolia next week

## 2022-11-16 NOTE — TELEPHONE ENCOUNTER
----- Message from [address] sent at 11/2/2022  2:22 PM EDT -----  Regarding: FW: Yesica Vega     ----- Message -----  From: Kitty Mcekon Maskal  Sent: 11/2/2022  10:39 AM EDT  To: [facility]  Subject: Tina Seeds morning Dr. Laila Muro,  Would it be possible for me to have my Prolio shot on Dec 7 or 8. Originally we had set the date for Dec 12 but [place] will be out of the [place] starting Dec 9. I rather have my Prolio shot before going away. Please let me know if you think this is possible.   Thank you   Brian

## 2022-12-01 ENCOUNTER — PATIENT MESSAGE (OUTPATIENT)
Dept: PRIMARY CARE CLINIC | Age: 57
End: 2022-12-01

## 2022-12-01 DIAGNOSIS — R73.03 PREDIABETES: ICD-10-CM

## 2022-12-01 DIAGNOSIS — I10 ESSENTIAL HYPERTENSION: Primary | ICD-10-CM

## 2022-12-01 DIAGNOSIS — E78.2 MIXED HYPERLIPIDEMIA: ICD-10-CM

## 2022-12-01 DIAGNOSIS — E55.9 VITAMIN D DEFICIENCY: ICD-10-CM

## 2022-12-01 DIAGNOSIS — E53.8 B12 DEFICIENCY: ICD-10-CM

## 2022-12-02 ENCOUNTER — TELEPHONE (OUTPATIENT)
Dept: PRIMARY CARE CLINIC | Age: 57
End: 2022-12-02

## 2022-12-02 NOTE — TELEPHONE ENCOUNTER
Called patient about her concerns. She wants blood work done asap due to swollen ankles and she is leaving the country very soon. Patient asked Ruthie Montanez is Dr. Pawel Carias so hesitant about sending in blood order\". I told the patient that Dr. Pawel Carias stated in the message that all her labs are within NORMAL standing and reccommended compression stockings. I told the patient because lab results are normal, there is no guerantee that insurance will cover. Patient stated she does not care about the money and is more concerned about getting blood work done now. No

## 2022-12-05 NOTE — TELEPHONE ENCOUNTER
Evan Simpson MD 12/5/2022 8:14 AM EST    Ask pt to make folow up appt this week.  ----- Message -----  From: Gregor Garcia LPN  Sent: 15/6/2481 4:18 PM EST  To: Julieth Singh MD  Subject: FW: Ankle swallowed       ----- Message -----  From: Mason Burroughs  Sent: 12/2/2022 12:26 PM EST  To: [facility]  Subject: Ankle swallowed     I still want my blood work done.  Please submit my blood test.

## 2022-12-05 NOTE — TELEPHONE ENCOUNTER
Spoke with patient she is going to come in for an appointment on 12/7 at [address]  Patient would like to get labs completed. Explained to the patient that per the provider her previous labs were normal so repeat labs were not needed. Explained that we try to keep the patient from getting a huge bill. Patient stated that she spoke with her insurance company and was told that labs would be covered.    Advised patient that I will notate what she stated because we do not do hospital billing so we just try to notify the patient up front so that a huge bill is not incurred   Patient gave list of labs she would like to have done prior to her appointment

## 2022-12-05 NOTE — TELEPHONE ENCOUNTER
Patient stated she called her insurance company and they said it's ok to get labs done. Patient will get labs done 12/6/2022 and office appointment 12/07/2022 with Dr. Kaylee Oliva.

## 2022-12-07 ENCOUNTER — HOSPITAL ENCOUNTER (OUTPATIENT)
Dept: ULTRASOUND IMAGING | Age: 57
Discharge: HOME | End: 2022-12-07
Attending: INTERNAL MEDICINE
Payer: COMMERCIAL

## 2022-12-07 ENCOUNTER — OFFICE VISIT (OUTPATIENT)
Dept: PRIMARY CARE CLINIC | Age: 57
End: 2022-12-07
Payer: COMMERCIAL

## 2022-12-07 ENCOUNTER — HOSPITAL ENCOUNTER (OUTPATIENT)
Dept: GENERAL RADIOLOGY | Age: 57
Discharge: HOME | End: 2022-12-07
Attending: INTERNAL MEDICINE
Payer: COMMERCIAL

## 2022-12-07 VITALS
BODY MASS INDEX: 25.9 KG/M2 | TEMPERATURE: 97.8 F | HEIGHT: 61 IN | RESPIRATION RATE: 18 BRPM | HEART RATE: 60 BPM | OXYGEN SATURATION: 99 % | DIASTOLIC BLOOD PRESSURE: 69 MMHG | WEIGHT: 137.2 LBS | SYSTOLIC BLOOD PRESSURE: 105 MMHG

## 2022-12-07 DIAGNOSIS — M25.471 RIGHT ANKLE SWELLING: Primary | ICD-10-CM

## 2022-12-07 DIAGNOSIS — M81.0 POSTMENOPAUSAL OSTEOPOROSIS: ICD-10-CM

## 2022-12-07 DIAGNOSIS — M79.604 PAIN OF RIGHT LOWER EXTREMITY: ICD-10-CM

## 2022-12-07 DIAGNOSIS — M25.471 RIGHT ANKLE SWELLING: ICD-10-CM

## 2022-12-07 PROCEDURE — 73610 X-RAY EXAM OF ANKLE: CPT

## 2022-12-07 PROCEDURE — 93971 EXTREMITY STUDY: CPT

## 2022-12-07 PROCEDURE — 99213 OFFICE O/P EST LOW 20 MIN: CPT | Performed by: INTERNAL MEDICINE

## 2022-12-07 RX ORDER — DENOSUMAB 60 MG/ML
60 INJECTION SUBCUTANEOUS ONCE
Qty: 1 ML | Refills: 0 | Status: SHIPPED | COMMUNITY
Start: 2022-12-07 | End: 2022-12-07 | Stop reason: CLARIF

## 2022-12-07 NOTE — PROGRESS NOTES
Chief Complaint   Patient presents with    Ankle Pain     Right ankle pain x 3-4 days         Visit Vitals  /69 (BP 1 Location: Left upper arm, BP Patient Position: Sitting)   Pulse 60   Temp 97.8 °F (36.6 °C) (Temporal)   Resp 18   Ht 5' 1\" (1.549 m)   Wt 137 lb 3.2 oz (62.2 kg)   LMP 09/07/2015 (Approximate)   SpO2 99%   BMI 25.92 kg/m²        Health Maintenance Due   Topic    COVID-19 Vaccine (4 - Booster for Pfizer series)        1. \"Have you been to the ER, urgent care clinic since your last visit? Hospitalized since your last visit? \" No    2. \"Have you seen or consulted any other health care providers outside of the [address] [place] [address] since your last visit? \" No     3. For patients aged 39-70: Has the patient had a colonoscopy / FIT/ Cologuard? Yes - no Care Gap present      If the patient is female:    4. For patients aged 41-77: Has the patient had a mammogram within the past 2 years? Yes - no Care Gap present      5. For patients aged 21-65: Has the patient had a pap smear?  Yes - no Care Gap present

## 2022-12-07 NOTE — PROGRESS NOTES
Laverne Cramer is a 59 y.o.  female and presents with     Chief Complaint   Patient presents with    Ankle Pain     Right ankle pain x 3-4 days      Pt is leaving for [facility] on Friday. RT ankle feels swollen and fells painful. Pt stays at home    Denies any falls or injuries. No outdoor activity  No fever or chills  Does not play any sports      Past Medical History:   Diagnosis Date    Adverse effect of anesthesia     Urinary retention    GERD (gastroesophageal reflux disease)     Hypercholesteremia     Hypercholesterolemia     Osteoporosis      Past Surgical History:   Procedure Laterality Date    HX BREAST REDUCTION Bilateral 10/28/2019    BILATERAL BREAST REDUCTION performed by Bill Ruano MD at [facility]    HX COLONOSCOPY      internal hemroid 2009    HX HEMORRHOIDECTOMY  2009    HX HERNIA REPAIR  2004     Current Outpatient Medications   Medication Sig    [START ON 12/15/2022] denosumab (PROLIA) 60 mg/mL injection 1 mL by SubCUTAneous route every 6 months. atorvastatin (LIPITOR) 40 mg tablet Take 1 Tablet by mouth nightly. pantoprazole (PROTONIX) 40 mg tablet Take 1 Tablet by mouth daily as needed (GERD). No current facility-administered medications for this visit.      Health Maintenance   Topic Date Due    COVID-19 Vaccine (4 - Booster for Pfizer series) 02/10/2022    A1C test (Diabetic or Prediabetic)  09/12/2023    Lipid Screen  09/12/2023    Colorectal Cancer Screening Combo  11/02/2023    Depression Screen  12/07/2023    Bone Densitometry  03/15/2024    Breast Cancer Screen Mammogram  03/15/2024    Cervical cancer screen  05/15/2024    DTaP/Tdap/Td series (2 - Td or Tdap) 07/15/2031    Hepatitis C Screening  Completed    Shingrix Vaccine Age 50>  Completed    Flu Vaccine  Completed    Pneumococcal 0-64 years  Aged [employer] History   Administered Date(s) Administered    COVID-19, PFIZER PURPLE top, DILUTE for use, (age 15 y+), IM, 30mcg/0.3mL 03/06/2021, 03/27/2021, 12/16/2021    Influenza Vaccine 09/15/2020    Influenza, FLUARIX, Beti Prow (age 10 mo+) AND AFLURIA, (age 1 y+), PF, 0.5mL 10/30/2019, 09/15/2022    Tdap 07/15/2021    Zoster Recombinant 12/05/2020, 02/24/2021     Patient's last menstrual period was 09/07/2015 (approximate). Allergies and Intolerances: Allergies   Allergen Reactions    Advil [Ibuprofen] Swelling     Lips only. No resp sxs       Family History:   Family History   Problem Relation Age of Onset    Heart Disease Mother     Cancer Father     Mult Sclerosis Sister     Throat cancer Brother        Social History:   She  reports that she quit smoking about 25 years ago. Her smoking use included cigarettes. She started smoking about 40 years ago. She has a 3.75 pack-year smoking history. She has never used smokeless tobacco.  She  reports current alcohol use of about 6.0 standard drinks per week.             Review of Systems:   General: negative for - chills, fatigue, fever, weight change  Psych: negative for - anxiety, depression, irritability or mood swings  ENT: negative for - headaches, hearing change, nasal congestion, oral lesions, sneezing or sore throat  Heme/ Lymph: negative for - bleeding problems, bruising, pallor or swollen lymph nodes  Endo: negative for - hot flashes, polydipsia/polyuria or temperature intolerance  Resp: negative for - cough, shortness of breath or wheezing  CV: negative for - chest pain, edema or palpitations  GI: negative for - abdominal pain, change in bowel habits, constipation, diarrhea or nausea/vomiting  : negative for - dysuria, hematuria, incontinence, pelvic pain or vulvar/vaginal symptoms  MSK: negative for - joint pain, joint swelling or muscle pain  Neuro: negative for - confusion, headaches, seizures or weakness  Derm: negative for - dry skin, hair changes, rash or skin lesion changes          Physical:   Vitals:   Vitals:    12/07/22 1014   BP: 105/69   Pulse: 60   Resp: 18   Temp: 97.8 °F (36.6 °C)   TempSrc: Temporal   SpO2: 99%   Weight: 137 lb 3.2 oz (62.2 kg)   Height: 5' 1\" (1.549 m)           Exam:   HEENT- atraumatic,normocephalic, awake, oriented, well nourished  Neck - supple,no enlarged lymph nodes, no JVD, no thyromegaly  Chest- CTA, no rhonchi, no crackles  Heart- rrr, no murmurs / gallop/rub  Abdomen- soft,BS+,NT, no hepatosplenomegaly  Ext - no c/c/edema , mild tenderness and swelling in the right ankle,  Neuro- no focal deficits. Power 5/5 all extremities  Skin - warm,dry, no obvious rashes. Review of Data:   LABS:   Lab Results   Component Value Date/Time    WBC 6.1 05/26/2022 09:25 AM    HGB 12.6 05/26/2022 09:25 AM    HCT 40.2 05/26/2022 09:25 AM    PLATELET 809 75/90/5446 09:25 AM     Lab Results   Component Value Date/Time    Sodium 137 09/12/2022 10:32 AM    Potassium 4.4 09/12/2022 10:32 AM    Chloride 102 09/12/2022 10:32 AM    CO2 30 09/12/2022 10:32 AM    Glucose 103 (H) 09/12/2022 10:32 AM    BUN 14 09/12/2022 10:32 AM    Creatinine 0.55 09/12/2022 10:32 AM     Lab Results   Component Value Date/Time    Cholesterol, total 211 (H) 09/12/2022 10:32 AM    HDL Cholesterol 62 09/12/2022 10:32 AM    LDL, calculated 124.2 (H) 09/12/2022 10:32 AM    Triglyceride 124 09/12/2022 10:32 AM     No components found for: GPT        Impression / Plan:        ICD-10-CM ICD-9-CM    1. Right ankle swelling  M25.471 719.07 URIC ACID      XR ANKLE RT MIN 3 V      DUPLEX LOWER EXT VENOUS RIGHT      CBC WITH AUTOMATED DIFF      2. Pain of right lower extremity  M79.604 729.5 DUPLEX LOWER EXT VENOUS RIGHT        Explained to patient risk benefits of the medications. Advised patient to stop meds if having any side effects. Pt verbalized understanding of the instructions. I have discussed the diagnosis with the patient and the intended plan as seen in the above orders. The patient has received an after-visit summary and questions were answered concerning future plans.   I have discussed medication side effects and warnings with the patient as well. I have reviewed the plan of care with the patient, accepted their input and they are in agreement with the treatment goals. Reviewed plan of care. Patient has provided input and agrees with goals.         Feliz Collins MD

## 2022-12-08 LAB
BASOPHILS # BLD: 0 K/UL (ref 0–0.1)
BASOPHILS NFR BLD: 0 % (ref 0–1)
DIFFERENTIAL METHOD BLD: NORMAL
EOSINOPHIL # BLD: 0.1 K/UL (ref 0–0.4)
EOSINOPHIL NFR BLD: 1 % (ref 0–7)
ERYTHROCYTE [DISTWIDTH] IN BLOOD BY AUTOMATED COUNT: 12.9 % (ref 11.5–14.5)
HCT VFR BLD AUTO: 41.6 % (ref 35–47)
HGB BLD-MCNC: 13.2 G/DL (ref 11.5–16)
IMM GRANULOCYTES # BLD AUTO: 0 K/UL (ref 0–0.04)
IMM GRANULOCYTES NFR BLD AUTO: 0 % (ref 0–0.5)
LYMPHOCYTES # BLD: 2.9 K/UL (ref 0.8–3.5)
LYMPHOCYTES NFR BLD: 41 % (ref 12–49)
MCH RBC QN AUTO: 29.5 PG (ref 26–34)
MCHC RBC AUTO-ENTMCNC: 31.7 G/DL (ref 30–36.5)
MCV RBC AUTO: 93.1 FL (ref 80–99)
MONOCYTES # BLD: 0.4 K/UL (ref 0–1)
MONOCYTES NFR BLD: 5 % (ref 5–13)
NEUTS SEG # BLD: 3.7 K/UL (ref 1.8–8)
NEUTS SEG NFR BLD: 52 % (ref 32–75)
NRBC # BLD: 0 K/UL (ref 0–0.01)
NRBC BLD-RTO: 0 PER 100 WBC
PLATELET # BLD AUTO: 187 K/UL (ref 150–400)
RBC # BLD AUTO: 4.47 M/UL (ref 3.8–5.2)
URATE SERPL-MCNC: 3.8 MG/DL (ref 2.6–6)
WBC # BLD AUTO: 7.1 K/UL (ref 3.6–11)

## 2022-12-08 NOTE — PROGRESS NOTES
Blood count and uriac acid levels are normal.  NO evidence of infection or gout. It must be sprained ankle. Take tylenol or motrin over the counter.   You should be fine

## 2023-02-21 ENCOUNTER — TRANSCRIBE ORDER (OUTPATIENT)
Dept: SCHEDULING | Age: 58
End: 2023-02-21

## 2023-02-21 DIAGNOSIS — Z12.31 VISIT FOR SCREENING MAMMOGRAM: Primary | ICD-10-CM

## 2023-03-13 DIAGNOSIS — E55.9 VITAMIN D DEFICIENCY: ICD-10-CM

## 2023-03-13 DIAGNOSIS — R73.03 PREDIABETES: ICD-10-CM

## 2023-03-13 DIAGNOSIS — E78.2 MIXED HYPERLIPIDEMIA: ICD-10-CM

## 2023-03-13 DIAGNOSIS — E53.8 B12 DEFICIENCY: ICD-10-CM

## 2023-03-13 DIAGNOSIS — I10 ESSENTIAL HYPERTENSION: ICD-10-CM

## 2023-03-14 LAB
25(OH)D3 SERPL-MCNC: 43 NG/ML (ref 30–100)
ALBUMIN SERPL-MCNC: 4.4 G/DL (ref 3.5–5)
ALBUMIN/GLOB SERPL: 1.4 (ref 1.1–2.2)
ALP SERPL-CCNC: 49 U/L (ref 45–117)
ALT SERPL-CCNC: 21 U/L (ref 12–78)
ANION GAP SERPL CALC-SCNC: 5 MMOL/L (ref 5–15)
AST SERPL-CCNC: 18 U/L (ref 15–37)
BASOPHILS # BLD: 0 K/UL (ref 0–0.1)
BASOPHILS NFR BLD: 0 % (ref 0–1)
BILIRUB SERPL-MCNC: 0.6 MG/DL (ref 0.2–1)
BUN SERPL-MCNC: 7 MG/DL (ref 6–20)
BUN/CREAT SERPL: 13 (ref 12–20)
CALCIUM SERPL-MCNC: 9.3 MG/DL (ref 8.5–10.1)
CHLORIDE SERPL-SCNC: 105 MMOL/L (ref 97–108)
CHOLEST SERPL-MCNC: 178 MG/DL
CO2 SERPL-SCNC: 28 MMOL/L (ref 21–32)
CREAT SERPL-MCNC: 0.53 MG/DL (ref 0.55–1.02)
DIFFERENTIAL METHOD BLD: NORMAL
EOSINOPHIL # BLD: 0.1 K/UL (ref 0–0.4)
EOSINOPHIL NFR BLD: 1 % (ref 0–7)
ERYTHROCYTE [DISTWIDTH] IN BLOOD BY AUTOMATED COUNT: 13.4 % (ref 11.5–14.5)
EST. AVERAGE GLUCOSE BLD GHB EST-MCNC: 117 MG/DL
GLOBULIN SER CALC-MCNC: 3.2 G/DL (ref 2–4)
GLUCOSE SERPL-MCNC: 90 MG/DL (ref 65–100)
HBA1C MFR BLD: 5.7 % (ref 4–5.6)
HCT VFR BLD AUTO: 40.2 % (ref 35–47)
HDLC SERPL-MCNC: 65 MG/DL
HDLC SERPL: 2.7 (ref 0–5)
HGB BLD-MCNC: 12.4 G/DL (ref 11.5–16)
IMM GRANULOCYTES # BLD AUTO: 0 K/UL (ref 0–0.04)
IMM GRANULOCYTES NFR BLD AUTO: 0 % (ref 0–0.5)
LDLC SERPL CALC-MCNC: 88.8 MG/DL (ref 0–100)
LYMPHOCYTES # BLD: 2.6 K/UL (ref 0.8–3.5)
LYMPHOCYTES NFR BLD: 38 % (ref 12–49)
MCH RBC QN AUTO: 28.8 PG (ref 26–34)
MCHC RBC AUTO-ENTMCNC: 30.8 G/DL (ref 30–36.5)
MCV RBC AUTO: 93.5 FL (ref 80–99)
MONOCYTES # BLD: 0.4 K/UL (ref 0–1)
MONOCYTES NFR BLD: 6 % (ref 5–13)
NEUTS SEG # BLD: 3.8 K/UL (ref 1.8–8)
NEUTS SEG NFR BLD: 56 % (ref 32–75)
NRBC # BLD: 0 K/UL (ref 0–0.01)
NRBC BLD-RTO: 0 PER 100 WBC
PLATELET # BLD AUTO: 179 K/UL (ref 150–400)
POTASSIUM SERPL-SCNC: 4.4 MMOL/L (ref 3.5–5.1)
PROT SERPL-MCNC: 7.6 G/DL (ref 6.4–8.2)
RBC # BLD AUTO: 4.3 M/UL (ref 3.8–5.2)
SODIUM SERPL-SCNC: 138 MMOL/L (ref 136–145)
TRIGL SERPL-MCNC: 121 MG/DL (ref ?–150)
VIT B12 SERPL-MCNC: 326 PG/ML (ref 193–986)
VLDLC SERPL CALC-MCNC: 24.2 MG/DL
WBC # BLD AUTO: 6.9 K/UL (ref 3.6–11)

## 2023-03-16 ENCOUNTER — OFFICE VISIT (OUTPATIENT)
Dept: PRIMARY CARE CLINIC | Age: 58
End: 2023-03-16

## 2023-03-16 VITALS
HEIGHT: 61 IN | HEART RATE: 63 BPM | DIASTOLIC BLOOD PRESSURE: 71 MMHG | WEIGHT: 147 LBS | TEMPERATURE: 98.4 F | RESPIRATION RATE: 18 BRPM | SYSTOLIC BLOOD PRESSURE: 110 MMHG | OXYGEN SATURATION: 100 % | BODY MASS INDEX: 27.75 KG/M2

## 2023-03-16 DIAGNOSIS — K21.9 GASTROESOPHAGEAL REFLUX DISEASE, UNSPECIFIED WHETHER ESOPHAGITIS PRESENT: ICD-10-CM

## 2023-03-16 DIAGNOSIS — R68.89 COLD INTOLERANCE: ICD-10-CM

## 2023-03-16 DIAGNOSIS — Z00.00 ANNUAL PHYSICAL EXAM: Primary | ICD-10-CM

## 2023-03-16 DIAGNOSIS — M81.0 OSTEOPOROSIS, UNSPECIFIED OSTEOPOROSIS TYPE, UNSPECIFIED PATHOLOGICAL FRACTURE PRESENCE: ICD-10-CM

## 2023-03-16 DIAGNOSIS — E78.2 MIXED HYPERLIPIDEMIA: ICD-10-CM

## 2023-03-16 DIAGNOSIS — R73.03 PREDIABETES: ICD-10-CM

## 2023-03-16 DIAGNOSIS — R00.2 PALPITATIONS: ICD-10-CM

## 2023-03-16 DIAGNOSIS — M25.471 RIGHT ANKLE SWELLING: ICD-10-CM

## 2023-03-16 DIAGNOSIS — E78.5 HYPERLIPIDEMIA LDL GOAL <100: ICD-10-CM

## 2023-03-16 DIAGNOSIS — Z23 NEED FOR PNEUMOCOCCAL VACCINATION: ICD-10-CM

## 2023-03-16 RX ORDER — PANTOPRAZOLE SODIUM 40 MG/1
40 TABLET, DELAYED RELEASE ORAL
Qty: 90 TABLET | Refills: 1 | Status: SHIPPED | OUTPATIENT
Start: 2023-03-16

## 2023-03-16 RX ORDER — PNEUMOCOCCAL 20-VALENT CONJUGATE VACCINE 2.2; 2.2; 2.2; 2.2; 2.2; 2.2; 2.2; 2.2; 2.2; 2.2; 2.2; 2.2; 2.2; 2.2; 2.2; 2.2; 4.4; 2.2; 2.2; 2.2 UG/.5ML; UG/.5ML; UG/.5ML; UG/.5ML; UG/.5ML; UG/.5ML; UG/.5ML; UG/.5ML; UG/.5ML; UG/.5ML; UG/.5ML; UG/.5ML; UG/.5ML; UG/.5ML; UG/.5ML; UG/.5ML; UG/.5ML; UG/.5ML; UG/.5ML; UG/.5ML
0.5 INJECTION, SUSPENSION INTRAMUSCULAR
Qty: 0.5 ML | Refills: 0 | Status: SHIPPED | OUTPATIENT
Start: 2023-03-16

## 2023-03-16 RX ORDER — ATORVASTATIN CALCIUM 40 MG/1
40 TABLET, FILM COATED ORAL
Qty: 90 TABLET | Refills: 1 | Status: SHIPPED | OUTPATIENT
Start: 2023-03-16

## 2023-03-16 NOTE — PROGRESS NOTES
Chief Complaint   Patient presents with    Physical     Patient wants to check thyroid         Visit Vitals  /71 (BP 1 Location: Left upper arm, BP Patient Position: Sitting)   Pulse 63   Temp 98.4 °F (36.9 °C) (Oral)   Resp 18   Ht 5' 1\" (1.549 m)   Wt 147 lb (66.7 kg)   LMP 09/07/2015 (Approximate)   SpO2 100%   BMI 27.78 kg/m²        There are no preventive care reminders to display for this patient. 1. \"Have you been to the ER, urgent care clinic since your last visit? Hospitalized since your last visit? \" No    2. \"Have you seen or consulted any other health care providers outside of the [address] [place] [address] since your last visit? \" No     3. For patients aged 39-70: Has the patient had a colonoscopy / FIT/ Cologuard? Yes - no Care Gap present      If the patient is female:    4. For patients aged 41-77: Has the patient had a mammogram within the past 2 years? Yes - no Care Gap present      5. For patients aged 21-65: Has the patient had a pap smear?  Yes - no Care Gap present

## 2023-03-17 NOTE — PROGRESS NOTES
Carlotta Medrano is a 59 y.o.  female and presents with     Chief Complaint   Patient presents with    Physical     Patient wants to check thyroid      Patient is here for a physical.  She wants to make sure that her heart is doing okay  Denies any chest pain or shortness of breath  She sometimes feels cold and sometimes heart. She wants to make sure its not on thyroid    Past Medical History:   Diagnosis Date    Adverse effect of anesthesia     Urinary retention    GERD (gastroesophageal reflux disease)     Hypercholesteremia     Hypercholesterolemia     Osteoporosis      Past Surgical History:   Procedure Laterality Date    HX BREAST REDUCTION Bilateral 10/28/2019    BILATERAL BREAST REDUCTION performed by Nick Menard MD at [facility]    HX COLONOSCOPY      internal hemroid 2009    HX HEMORRHOIDECTOMY  2009    HX HERNIA REPAIR  2004     Current Outpatient Medications   Medication Sig    [START ON 6/8/2023] denosumab (PROLIA) 60 mg/mL injection 1 mL by SubCUTAneous route every 6 months. pneumococcal 20-edgard conj-dip, PF, (Prevnar 20, PF,) 0.5 mL syrg injection 0.5 mL by IntraMUSCular route PRIOR TO DISCHARGE for 1 dose. atorvastatin (LIPITOR) 40 mg tablet Take 1 Tablet by mouth nightly. pantoprazole (PROTONIX) 40 mg tablet Take 1 Tablet by mouth daily as needed (GERD). No current facility-administered medications for this visit.      Health Maintenance   Topic Date Due    COVID-19 Vaccine (5 - Booster for Pfizer series) 12/31/2024 (Originally 8/1/2022)    Colorectal Cancer Screening Combo  11/02/2023    A1C test (Diabetic or Prediabetic)  03/13/2024    Lipid Screen  03/13/2024    Bone Densitometry  03/15/2024    Breast Cancer Screen Mammogram  03/15/2024    Depression Screen  03/16/2024    Cervical cancer screen  05/15/2024    DTaP/Tdap/Td series (2 - Td or Tdap) 07/15/2031    Hepatitis C Screening  Completed    Shingles Vaccine  Completed    Flu Vaccine  Completed    Pneumococcal 0-64 years  Aged [employer] History   Administered Date(s) Administered    COVID-19, PFIZER PURPLE top, DILUTE for use, (age 15 y+), IM, 30mcg/0.3mL 03/06/2021, 03/27/2021, 12/16/2021, 06/06/2022    Influenza Vaccine 09/15/2020, 09/19/2022    Influenza, FLUARIX, FLULAVAL, Mikle Cochran (age 10 mo+) AND AFLURIA, (age 1 y+), PF, 0.5mL 10/30/2019, 09/15/2022    Tdap 07/15/2021    Zoster Recombinant 12/05/2020, 02/24/2021     Patient's last menstrual period was 09/07/2015 (approximate). Allergies and Intolerances: Allergies   Allergen Reactions    Advil [Ibuprofen] Swelling     Lips only. No resp sxs       Family History:   Family History   Problem Relation Age of Onset    Heart Disease Mother     Cancer Father     Mult Sclerosis Sister     Throat cancer Brother        Social History:   She  reports that she quit smoking about 25 years ago. Her smoking use included cigarettes. She started smoking about 40 years ago. She has a 3.75 pack-year smoking history. She has never used smokeless tobacco.  She  reports current alcohol use of about 6.0 standard drinks per week.             Review of Systems:   General: negative for - chills, fatigue, fever, weight change  Psych: negative for - anxiety, depression, irritability or mood swings  ENT: negative for - headaches, hearing change, nasal congestion, oral lesions, sneezing or sore throat  Heme/ Lymph: negative for - bleeding problems, bruising, pallor or swollen lymph nodes  Endo: negative for - hot flashes, polydipsia/polyuria or temperature intolerance  Resp: negative for - cough, shortness of breath or wheezing  CV: negative for - chest pain, edema or palpitations  GI: negative for - abdominal pain, change in bowel habits, constipation, diarrhea or nausea/vomiting  : negative for - dysuria, hematuria, incontinence, pelvic pain or vulvar/vaginal symptoms  MSK: negative for - joint pain, joint swelling or muscle pain  Neuro: negative for - confusion, headaches, seizures or weakness  Derm: negative for - dry skin, hair changes, rash or skin lesion changes          Physical:   Vitals:   Vitals:    03/16/23 1104   BP: 110/71   Pulse: 63   Resp: 18   Temp: 98.4 °F (36.9 °C)   TempSrc: Oral   SpO2: 100%   Weight: 147 lb (66.7 kg)   Height: 5' 1\" (1.549 m)           Exam:   HEENT- atraumatic,normocephalic, awake, oriented, well nourished  Neck - supple,no enlarged lymph nodes, no JVD, no thyromegaly  Chest- CTA, no rhonchi, no crackles  Heart- rrr, no murmurs / gallop/rub  Abdomen- soft,BS+,NT, no hepatosplenomegaly  Ext - no c/c/edema   Neuro- no focal deficits. Power 5/5 all extremities  Skin - warm,dry, no obvious rashes. Review of Data:   LABS:   Lab Results   Component Value Date/Time    WBC 6.9 03/13/2023 11:33 AM    HGB 12.4 03/13/2023 11:33 AM    HCT 40.2 03/13/2023 11:33 AM    PLATELET 741 94/94/3119 11:33 AM     Lab Results   Component Value Date/Time    Sodium 138 03/13/2023 11:33 AM    Potassium 4.4 03/13/2023 11:33 AM    Chloride 105 03/13/2023 11:33 AM    CO2 28 03/13/2023 11:33 AM    Glucose 90 03/13/2023 11:33 AM    BUN 7 03/13/2023 11:33 AM    Creatinine 0.53 (L) 03/13/2023 11:33 AM     Lab Results   Component Value Date/Time    Cholesterol, total 178 03/13/2023 11:33 AM    HDL Cholesterol 65 03/13/2023 11:33 AM    LDL, calculated 88.8 03/13/2023 11:33 AM    Triglyceride 121 03/13/2023 11:33 AM     No components found for: GPT        Impression / Plan:        ICD-10-CM ICD-9-CM    1. Annual physical exam  Z00.00 V70.0 URINALYSIS W/ RFLX MICROSCOPIC      AMB POC EKG ROUTINE W/ 12 LEADS, INTER & REP      2. Cold intolerance  R68.89 780.99 TSH 3RD GENERATION      T4, FREE      3. Prediabetes  R73.03 790.29       4. Mixed hyperlipidemia  E78.2 272.2       5. Osteoporosis, unspecified osteoporosis type, unspecified pathological fracture presence  M81.0 733.00 denosumab (PROLIA) 60 mg/mL injection      6.  Need for pneumococcal vaccination  Z23 V03.82 pneumococcal 20-edgard conj-dip, PF, (Prevnar 20, PF,) 0.5 mL syrg injection      7. Hyperlipidemia LDL goal <100  E78.5 272.4 atorvastatin (LIPITOR) 40 mg tablet      8. Gastroesophageal reflux disease, unspecified whether esophagitis present  K21.9 530.81 pantoprazole (PROTONIX) 40 mg tablet      9. Palpitations  R00.2 785.1 AMB POC EKG ROUTINE W/ 12 LEADS, INTER & REP      10. Right ankle swelling  M25.471 719.07 REFERRAL TO PODIATRY      EKG shows normal sinus rhythm, no acute ST-T changes      Explained to patient risk benefits of the medications. Advised patient to stop meds if having any side effects. Pt verbalized understanding of the instructions. I have discussed the diagnosis with the patient and the intended plan as seen in the above orders. The patient has received an after-visit summary and questions were answered concerning future plans. I have discussed medication side effects and warnings with the patient as well. I have reviewed the plan of care with the patient, accepted their input and they are in agreement with the treatment goals. Reviewed plan of care. Patient has provided input and agrees with goals. Follow-up and Dispositions    Return in about 6 months (around 9/16/2023).          Dilia Patterson MD

## 2023-03-31 ENCOUNTER — HOSPITAL ENCOUNTER (OUTPATIENT)
Dept: MAMMOGRAPHY | Age: 58
Discharge: HOME | End: 2023-03-31
Attending: INTERNAL MEDICINE
Payer: COMMERCIAL

## 2023-03-31 DIAGNOSIS — Z12.31 VISIT FOR SCREENING MAMMOGRAM: ICD-10-CM

## 2023-03-31 PROCEDURE — 77063 BREAST TOMOSYNTHESIS BI: CPT

## 2023-04-24 DIAGNOSIS — M81.0 OSTEOPOROSIS, UNSPECIFIED OSTEOPOROSIS TYPE, UNSPECIFIED PATHOLOGICAL FRACTURE PRESENCE: ICD-10-CM

## 2023-04-24 RX ORDER — DENOSUMAB 60 MG/ML
INJECTION SUBCUTANEOUS
Qty: 1 EACH | Refills: 0 | Status: SHIPPED | OUTPATIENT
Start: 2023-04-24

## 2023-05-22 RX ORDER — PANTOPRAZOLE SODIUM 40 MG/1
40 TABLET, DELAYED RELEASE ORAL DAILY PRN
COMMUNITY
Start: 2023-03-16

## 2023-05-22 RX ORDER — PNEUMOCOCCAL 20-VALENT CONJUGATE VACCINE 2.2; 2.2; 2.2; 2.2; 2.2; 2.2; 2.2; 2.2; 2.2; 2.2; 2.2; 2.2; 2.2; 2.2; 2.2; 2.2; 4.4; 2.2; 2.2; 2.2 UG/.5ML; UG/.5ML; UG/.5ML; UG/.5ML; UG/.5ML; UG/.5ML; UG/.5ML; UG/.5ML; UG/.5ML; UG/.5ML; UG/.5ML; UG/.5ML; UG/.5ML; UG/.5ML; UG/.5ML; UG/.5ML; UG/.5ML; UG/.5ML; UG/.5ML; UG/.5ML
0.5 INJECTION, SUSPENSION INTRAMUSCULAR
COMMUNITY
Start: 2023-03-16

## 2023-05-22 RX ORDER — DENOSUMAB 60 MG/ML
INJECTION SUBCUTANEOUS
COMMUNITY
Start: 2023-04-24 | End: 2023-06-08 | Stop reason: SDUPTHER

## 2023-05-22 RX ORDER — ATORVASTATIN CALCIUM 40 MG/1
1 TABLET, FILM COATED ORAL NIGHTLY
COMMUNITY
Start: 2023-03-16

## 2023-06-06 ENCOUNTER — TELEPHONE (OUTPATIENT)
Dept: PRIMARY CARE CLINIC | Facility: CLINIC | Age: 58
End: 2023-06-06

## 2023-06-06 NOTE — TELEPHONE ENCOUNTER
===View-only below this line===  ----- Message -----  From: Tee Saxena  Sent: 6/6/2023  10:28 AM EDT  To: , *  Subject: Appointment Request                              Appointment Request From: Eufemia Rivero    With Provider: Ada Bonner MD [[facility]]    Preferred Date Range: 6/9/2023 - 6/16/2023    Preferred Times: Any Time    Reason for visit: Request an Appointment    Comments:  Dr Dr. Shelley Del Toro  I want come into your office to get my Prolio.  Please order

## 2023-06-07 NOTE — TELEPHONE ENCOUNTER
Messaged patient to see if she was okay with the doctor sending in [address] injection to pharmacy and that she will need to go to the infusion center to have it done.  Waiting for response

## 2023-06-08 DIAGNOSIS — M81.0 AGE-RELATED OSTEOPOROSIS WITHOUT CURRENT PATHOLOGICAL FRACTURE: Primary | ICD-10-CM

## 2023-06-08 RX ORDER — DENOSUMAB 60 MG/ML
60 INJECTION SUBCUTANEOUS ONCE
Qty: 1 ML | Refills: 0 | Status: SHIPPED | OUTPATIENT
Start: 2023-06-08 | End: 2023-06-08

## 2023-06-23 NOTE — TELEPHONE ENCOUNTER
PCP: Marilee Ramos MD    Last appt: 2022  Future Appointments   Date Time Provider Shahbaz Villegas   2022  1:00 PM [facility] NURSE [facility] BS AMB   2022  2:00 PM Yaritza Lam MD [facility] BS AMB       Requested Prescriptions     Pending Prescriptions Disp Refills    denosumab (PROLIA) 60 mg/mL injection 1 mL 0     Si mL by SubCUTAneous route every 6 months.          Other Comments:
 used

## 2023-07-10 DIAGNOSIS — Z29.8 NEED FOR MALARIA PROPHYLAXIS: Primary | ICD-10-CM

## 2023-07-10 RX ORDER — DOXYCYCLINE HYCLATE 100 MG
TABLET ORAL
Qty: 60 TABLET | Refills: 0 | Status: SHIPPED | OUTPATIENT
Start: 2023-07-10

## 2023-09-21 DIAGNOSIS — Z00.00 ANNUAL PHYSICAL EXAM: Primary | ICD-10-CM

## 2023-09-21 DIAGNOSIS — E55.9 VITAMIN D DEFICIENCY, UNSPECIFIED: ICD-10-CM

## 2023-09-21 DIAGNOSIS — R73.03 PREDIABETES: ICD-10-CM

## 2023-09-26 DIAGNOSIS — E55.9 VITAMIN D DEFICIENCY, UNSPECIFIED: ICD-10-CM

## 2023-09-26 DIAGNOSIS — Z00.00 ANNUAL PHYSICAL EXAM: ICD-10-CM

## 2023-09-26 DIAGNOSIS — R73.03 PREDIABETES: ICD-10-CM

## 2023-09-26 LAB
25(OH)D3 SERPL-MCNC: 29.3 NG/ML (ref 30–100)
ERYTHROCYTE [DISTWIDTH] IN BLOOD BY AUTOMATED COUNT: 13.5 % (ref 11.5–14.5)
HCT VFR BLD AUTO: 40 % (ref 35–47)
HGB BLD-MCNC: 12.8 G/DL (ref 11.5–16)
MCH RBC QN AUTO: 29.3 PG (ref 26–34)
MCHC RBC AUTO-ENTMCNC: 32 G/DL (ref 30–36.5)
MCV RBC AUTO: 91.5 FL (ref 80–99)
NRBC # BLD: 0 K/UL (ref 0–0.01)
NRBC BLD-RTO: 0 PER 100 WBC
PLATELET # BLD AUTO: 177 K/UL (ref 150–400)
RBC # BLD AUTO: 4.37 M/UL (ref 3.8–5.2)
WBC # BLD AUTO: 6.2 K/UL (ref 3.6–11)

## 2023-09-27 LAB
ALBUMIN SERPL-MCNC: 3.9 G/DL (ref 3.5–5)
ALBUMIN/GLOB SERPL: 1.2 (ref 1.1–2.2)
ALP SERPL-CCNC: 56 U/L (ref 45–117)
ALT SERPL-CCNC: 30 U/L (ref 12–78)
ANION GAP SERPL CALC-SCNC: 6 MMOL/L (ref 5–15)
AST SERPL-CCNC: 17 U/L (ref 15–37)
BILIRUB SERPL-MCNC: 0.7 MG/DL (ref 0.2–1)
BUN SERPL-MCNC: 9 MG/DL (ref 6–20)
BUN/CREAT SERPL: 14 (ref 12–20)
CALCIUM SERPL-MCNC: 9 MG/DL (ref 8.5–10.1)
CHLORIDE SERPL-SCNC: 105 MMOL/L (ref 97–108)
CHOLEST SERPL-MCNC: 199 MG/DL
CO2 SERPL-SCNC: 28 MMOL/L (ref 21–32)
CREAT SERPL-MCNC: 0.63 MG/DL (ref 0.55–1.02)
EST. AVERAGE GLUCOSE BLD GHB EST-MCNC: 126 MG/DL
GLOBULIN SER CALC-MCNC: 3.3 G/DL (ref 2–4)
GLUCOSE SERPL-MCNC: 95 MG/DL (ref 65–100)
HBA1C MFR BLD: 6 % (ref 4–5.6)
HDLC SERPL-MCNC: 57 MG/DL
HDLC SERPL: 3.5 (ref 0–5)
LDLC SERPL CALC-MCNC: 99 MG/DL (ref 0–100)
POTASSIUM SERPL-SCNC: 4.3 MMOL/L (ref 3.5–5.1)
PROT SERPL-MCNC: 7.2 G/DL (ref 6.4–8.2)
SODIUM SERPL-SCNC: 139 MMOL/L (ref 136–145)
TRIGL SERPL-MCNC: 215 MG/DL
TSH SERPL DL<=0.05 MIU/L-ACNC: 1.13 UIU/ML (ref 0.36–3.74)
VLDLC SERPL CALC-MCNC: 43 MG/DL

## 2023-09-28 ENCOUNTER — OFFICE VISIT (OUTPATIENT)
Dept: PRIMARY CARE CLINIC | Facility: CLINIC | Age: 58
End: 2023-09-28

## 2023-09-28 VITALS
DIASTOLIC BLOOD PRESSURE: 78 MMHG | WEIGHT: 146 LBS | HEART RATE: 66 BPM | TEMPERATURE: 98.8 F | OXYGEN SATURATION: 98 % | SYSTOLIC BLOOD PRESSURE: 125 MMHG | BODY MASS INDEX: 27.56 KG/M2 | HEIGHT: 61 IN | RESPIRATION RATE: 18 BRPM

## 2023-09-28 DIAGNOSIS — R05.1 ACUTE COUGH: ICD-10-CM

## 2023-09-28 DIAGNOSIS — Z11.59 NEED FOR HEPATITIS B SCREENING TEST: ICD-10-CM

## 2023-09-28 DIAGNOSIS — J02.0 STREP THROAT: ICD-10-CM

## 2023-09-28 DIAGNOSIS — K21.9 GASTROESOPHAGEAL REFLUX DISEASE WITHOUT ESOPHAGITIS: ICD-10-CM

## 2023-09-28 DIAGNOSIS — R61 UNEXPLAINED NIGHT SWEATS: ICD-10-CM

## 2023-09-28 DIAGNOSIS — Z11.59 NEED FOR HEPATITIS C SCREENING TEST: ICD-10-CM

## 2023-09-28 DIAGNOSIS — M81.0 AGE-RELATED OSTEOPOROSIS WITHOUT CURRENT PATHOLOGICAL FRACTURE: ICD-10-CM

## 2023-09-28 DIAGNOSIS — J02.9 SORE THROAT: ICD-10-CM

## 2023-09-28 DIAGNOSIS — R61 CHRONIC NIGHT SWEATS: ICD-10-CM

## 2023-09-28 DIAGNOSIS — R73.03 PREDIABETES: ICD-10-CM

## 2023-09-28 DIAGNOSIS — E55.9 VITAMIN D DEFICIENCY: ICD-10-CM

## 2023-09-28 DIAGNOSIS — R61 NIGHT SWEATS: ICD-10-CM

## 2023-09-28 DIAGNOSIS — Z00.00 ANNUAL PHYSICAL EXAM: Primary | ICD-10-CM

## 2023-09-28 DIAGNOSIS — E78.2 MIXED HYPERLIPIDEMIA: ICD-10-CM

## 2023-09-28 LAB
GROUP A STREP ANTIGEN, POC: POSITIVE
LOT EXPIRE DATE: NORMAL
LOT KIT NUMBER: NORMAL
QUICKVUE INFLUENZA TEST: NEGATIVE
SARS-COV-2, POC: NORMAL
VALID INTERNAL CONTROL, POC: YES
VALID INTERNAL CONTROL, POC: YES
VALID INTERNAL CONTROL: YES
VENDOR AND KIT NAME POC: NORMAL

## 2023-09-28 RX ORDER — AZITHROMYCIN 250 MG/1
250 TABLET, FILM COATED ORAL SEE ADMIN INSTRUCTIONS
Qty: 6 TABLET | Refills: 0 | Status: SHIPPED | OUTPATIENT
Start: 2023-09-28 | End: 2023-10-03

## 2023-09-28 RX ORDER — ATORVASTATIN CALCIUM 40 MG/1
40 TABLET, FILM COATED ORAL NIGHTLY
Qty: 90 TABLET | Refills: 1 | Status: SHIPPED | OUTPATIENT
Start: 2023-09-28

## 2023-09-28 RX ORDER — ALENDRONATE SODIUM 70 MG/1
70 TABLET ORAL
Qty: 12 TABLET | Refills: 1 | Status: SHIPPED | OUTPATIENT
Start: 2023-09-28

## 2023-09-28 RX ORDER — ERGOCALCIFEROL 1.25 MG/1
50000 CAPSULE ORAL WEEKLY
Qty: 12 CAPSULE | Refills: 1 | Status: SHIPPED | OUTPATIENT
Start: 2023-09-28

## 2023-09-28 RX ORDER — PANTOPRAZOLE SODIUM 40 MG/1
40 TABLET, DELAYED RELEASE ORAL DAILY
Qty: 90 TABLET | Refills: 3 | Status: SHIPPED | OUTPATIENT
Start: 2023-09-28

## 2023-09-29 LAB
HBV SURFACE AG SER QL: 0.5 INDEX
HBV SURFACE AG SER QL: NEGATIVE
HCV AB SER IA-ACNC: 0.12 INDEX
HCV AB SERPL QL IA: NONREACTIVE

## 2023-09-30 LAB — HBV CORE AB SERPL QL IA: NEGATIVE

## 2023-11-20 NOTE — TELEPHONE ENCOUNTER
PCP: Steve Ramirez MD    Last Visit 9/28/2023   No future appointments. Requested Prescriptions     Pending Prescriptions Disp Refills    PROLIA 60 MG/ML SOSY SC injection [Pharmacy Med Name: Farzaneh Reina PFS 60MG/ML]  0     Sig: TO BE ADMINISTERED IN PHYSICIAN'S OFFICE. INJECT ONE SYRINGE SUBCUTANEOUSLY ONCE EVERY 6 MONTHS. REFRIGERATE. USE WITHIN 14 DAYS ONCE AT ROOM TEMPERATURE.          Other Comments: Last Refill

## 2023-11-22 RX ORDER — DENOSUMAB 60 MG/ML
INJECTION SUBCUTANEOUS
Qty: 1 EACH | Refills: 0 | Status: SHIPPED | OUTPATIENT
Start: 2023-11-22

## 2024-03-13 ENCOUNTER — TRANSCRIBE ORDERS (OUTPATIENT)
Facility: HOSPITAL | Age: 59
End: 2024-03-13

## 2024-03-13 DIAGNOSIS — Z12.31 VISIT FOR SCREENING MAMMOGRAM: Primary | ICD-10-CM

## 2024-04-01 ENCOUNTER — HOSPITAL ENCOUNTER (OUTPATIENT)
Facility: HOSPITAL | Age: 59
Discharge: HOME | End: 2024-04-04
Payer: COMMERCIAL

## 2024-04-01 DIAGNOSIS — Z12.31 VISIT FOR SCREENING MAMMOGRAM: ICD-10-CM

## 2024-04-01 PROCEDURE — 77063 BREAST TOMOSYNTHESIS BI: CPT

## 2024-05-08 ENCOUNTER — COMMUNITY OUTREACH (OUTPATIENT)
Dept: PRIMARY CARE CLINIC | Facility: CLINIC | Age: 59
End: 2024-05-08

## 2024-05-08 NOTE — PROGRESS NOTES
Patient's HM shows they are overdue for Colorectal Screening.   Care Everywhere and  files searched.  HM updated with 11/2/18 Colonoscopy.      Faxed request to Gastrointestinal Specialist, Dr. Wang at [phone].-no records received.

## 2024-06-13 DIAGNOSIS — M81.0 AGE-RELATED OSTEOPOROSIS WITHOUT CURRENT PATHOLOGICAL FRACTURE: ICD-10-CM

## 2024-06-13 RX ORDER — ALENDRONATE SODIUM 70 MG/1
70 TABLET ORAL
Qty: 12 TABLET | Refills: 1 | Status: SHIPPED | OUTPATIENT
Start: 2024-06-13

## 2024-08-06 DIAGNOSIS — E78.2 MIXED HYPERLIPIDEMIA: ICD-10-CM

## 2024-08-07 RX ORDER — ATORVASTATIN CALCIUM 40 MG/1
40 TABLET, FILM COATED ORAL NIGHTLY
Qty: 90 TABLET | Refills: 1 | Status: SHIPPED | OUTPATIENT
Start: 2024-08-07

## 2024-09-10 DIAGNOSIS — K21.9 GASTROESOPHAGEAL REFLUX DISEASE WITHOUT ESOPHAGITIS: ICD-10-CM

## 2024-09-10 RX ORDER — PANTOPRAZOLE SODIUM 40 MG/1
40 TABLET, DELAYED RELEASE ORAL DAILY
Qty: 90 TABLET | Refills: 0 | Status: SHIPPED | OUTPATIENT
Start: 2024-09-10 | End: 2024-12-09

## 2024-09-25 ENCOUNTER — TELEPHONE (OUTPATIENT)
Dept: PRIMARY CARE CLINIC | Facility: CLINIC | Age: 59
End: 2024-09-25

## 2024-09-25 NOTE — TELEPHONE ENCOUNTER
Request for a Bone Density Scan through [facility]:    Appointment Request From: Tee Berry     With Provider: Dr. Floyd Gerber MD [[facility]]     Preferred Date Range: 9/26/2024 - 9/30/2024     Preferred Times: Any Time     Reason for visit: Request an Appointment     Comments:  I need a bone density scan.

## 2024-09-26 ENCOUNTER — TELEPHONE (OUTPATIENT)
Dept: PRIMARY CARE CLINIC | Facility: CLINIC | Age: 59
End: 2024-09-26

## 2024-09-26 DIAGNOSIS — Z78.0 POST-MENOPAUSAL: Primary | ICD-10-CM

## 2024-10-03 ENCOUNTER — HOSPITAL ENCOUNTER (OUTPATIENT)
Facility: HOSPITAL | Age: 59
Discharge: HOME | End: 2024-10-06
Payer: COMMERCIAL

## 2024-10-03 DIAGNOSIS — Z78.0 POST-MENOPAUSAL: ICD-10-CM

## 2024-10-03 DIAGNOSIS — K21.9 GASTROESOPHAGEAL REFLUX DISEASE WITHOUT ESOPHAGITIS: ICD-10-CM

## 2024-10-03 PROCEDURE — 77080 DXA BONE DENSITY AXIAL: CPT

## 2024-10-04 NOTE — TELEPHONE ENCOUNTER
PCP: Floyd Gerber MD    Last Visit 9/28/2023   Future Appointments   Date Time Provider Department Center   11/25/2024  8:30 AM Floyd Gerber MD [facility]       Requested Prescriptions     Pending Prescriptions Disp Refills    pantoprazole (PROTONIX) 40 MG tablet 90 tablet 0     Sig: Take 1 tablet by mouth daily         Other Comments: Last Refill

## 2024-10-05 RX ORDER — PANTOPRAZOLE SODIUM 40 MG/1
40 TABLET, DELAYED RELEASE ORAL DAILY
Qty: 90 TABLET | Refills: 0 | Status: SHIPPED | OUTPATIENT
Start: 2024-10-05 | End: 2025-01-03

## 2024-10-07 SDOH — ECONOMIC STABILITY: TRANSPORTATION INSECURITY
IN THE PAST 12 MONTHS, HAS LACK OF TRANSPORTATION KEPT YOU FROM MEETINGS, WORK, OR FROM GETTING THINGS NEEDED FOR DAILY LIVING?: PATIENT DECLINED

## 2024-10-07 SDOH — ECONOMIC STABILITY: INCOME INSECURITY: HOW HARD IS IT FOR YOU TO PAY FOR THE VERY BASICS LIKE FOOD, HOUSING, MEDICAL CARE, AND HEATING?: PATIENT DECLINED

## 2024-10-07 SDOH — ECONOMIC STABILITY: FOOD INSECURITY: WITHIN THE PAST 12 MONTHS, THE FOOD YOU BOUGHT JUST DIDN'T LAST AND YOU DIDN'T HAVE MONEY TO GET MORE.: PATIENT DECLINED

## 2024-10-07 SDOH — ECONOMIC STABILITY: FOOD INSECURITY: WITHIN THE PAST 12 MONTHS, YOU WORRIED THAT YOUR FOOD WOULD RUN OUT BEFORE YOU GOT MONEY TO BUY MORE.: PATIENT DECLINED

## 2024-10-08 ENCOUNTER — TELEPHONE (OUTPATIENT)
Age: 59
End: 2024-10-08

## 2024-10-08 ENCOUNTER — OFFICE VISIT (OUTPATIENT)
Dept: PRIMARY CARE CLINIC | Facility: CLINIC | Age: 59
End: 2024-10-08
Payer: COMMERCIAL

## 2024-10-08 VITALS
OXYGEN SATURATION: 99 % | HEART RATE: 51 BPM | HEIGHT: 61 IN | BODY MASS INDEX: 26.81 KG/M2 | RESPIRATION RATE: 18 BRPM | TEMPERATURE: 98.3 F | DIASTOLIC BLOOD PRESSURE: 78 MMHG | SYSTOLIC BLOOD PRESSURE: 129 MMHG | WEIGHT: 142 LBS

## 2024-10-08 DIAGNOSIS — M79.601 PAIN OF RIGHT UPPER EXTREMITY: ICD-10-CM

## 2024-10-08 DIAGNOSIS — E78.2 MIXED HYPERLIPIDEMIA: ICD-10-CM

## 2024-10-08 DIAGNOSIS — K21.9 GASTROESOPHAGEAL REFLUX DISEASE WITHOUT ESOPHAGITIS: ICD-10-CM

## 2024-10-08 DIAGNOSIS — Z00.00 ANNUAL PHYSICAL EXAM: ICD-10-CM

## 2024-10-08 DIAGNOSIS — E83.42 HYPOMAGNESEMIA: ICD-10-CM

## 2024-10-08 DIAGNOSIS — E55.9 VITAMIN D DEFICIENCY: ICD-10-CM

## 2024-10-08 DIAGNOSIS — M54.12 CERVICAL RADICULOPATHY: ICD-10-CM

## 2024-10-08 DIAGNOSIS — R55 SYNCOPE, UNSPECIFIED SYNCOPE TYPE: Primary | ICD-10-CM

## 2024-10-08 PROCEDURE — 99214 OFFICE O/P EST MOD 30 MIN: CPT | Performed by: INTERNAL MEDICINE

## 2024-10-08 PROCEDURE — 90661 CCIIV3 VAC ABX FR 0.5 ML IM: CPT | Performed by: INTERNAL MEDICINE

## 2024-10-08 PROCEDURE — 90471 IMMUNIZATION ADMIN: CPT | Performed by: INTERNAL MEDICINE

## 2024-10-08 RX ORDER — PANTOPRAZOLE SODIUM 40 MG/1
40 TABLET, DELAYED RELEASE ORAL DAILY
Qty: 90 TABLET | Refills: 0 | Status: SHIPPED | OUTPATIENT
Start: 2024-10-08 | End: 2025-01-06

## 2024-10-08 RX ORDER — ATORVASTATIN CALCIUM 40 MG/1
40 TABLET, FILM COATED ORAL NIGHTLY
Qty: 90 TABLET | Refills: 1 | Status: SHIPPED | OUTPATIENT
Start: 2024-10-08

## 2024-10-08 ASSESSMENT — PATIENT HEALTH QUESTIONNAIRE - PHQ9
SUM OF ALL RESPONSES TO PHQ QUESTIONS 1-9: 0
SUM OF ALL RESPONSES TO PHQ QUESTIONS 1-9: 0
SUM OF ALL RESPONSES TO PHQ9 QUESTIONS 1 & 2: 0
1. LITTLE INTEREST OR PLEASURE IN DOING THINGS: NOT AT ALL
2. FEELING DOWN, DEPRESSED OR HOPELESS: NOT AT ALL
SUM OF ALL RESPONSES TO PHQ QUESTIONS 1-9: 0
SUM OF ALL RESPONSES TO PHQ QUESTIONS 1-9: 0

## 2024-10-08 NOTE — TELEPHONE ENCOUNTER
Patient is traveling in a few hours and dosage of medication came in incorrect, gabapentin, theres no 25mg, please follow up    P#: [phone]

## 2024-10-09 ENCOUNTER — TELEPHONE (OUTPATIENT)
Dept: PRIMARY CARE CLINIC | Facility: CLINIC | Age: 59
End: 2024-10-09

## 2024-10-09 DIAGNOSIS — M54.12 CERVICAL RADICULOPATHY: Primary | ICD-10-CM

## 2024-10-09 RX ORDER — PREGABALIN 50 MG/1
50 CAPSULE ORAL
Qty: 90 CAPSULE | Refills: 0 | Status: SHIPPED | OUTPATIENT
Start: 2024-10-09 | End: 2025-01-07

## 2024-10-09 NOTE — TELEPHONE ENCOUNTER
Patient called to check on her medication at [facility] but she was confused on the location on where the medication was at.  Once she found out where it was she said thank you.

## 2024-10-09 NOTE — PROGRESS NOTES
\"Have you been to the ER, urgent care clinic since your last visit?  Hospitalized since your last visit?\"    NO    “Have you seen or consulted any other health care providers outside our system since your last visit?”    NO     “Have you had a pap smear?”    NO Dr. Mery Lombardi     Date of last Cervical Cancer screen (HPV or PAP): 5/15/2019       “Have you had a colorectal cancer screening such as a colonoscopy/FIT/Cologuard?    NO11/2023 Gastro Dr. Jaime Thomas    Date of last Colonoscopy: 11/2/2018  No cologuard on file  No FIT/FOBT on file   No flexible sigmoidoscopy on file          
disc at C3-4, C4-5, C5-6 with mild-to-moderate foraminal stenosis, most severe at C5-6. Gabapentin 50 mg was prescribed for bedtime use until she can see a specialist. Physical therapy was ordered to strengthen the muscles. She was advised to apply BenGay, lidocaine, and warm compresses for pain relief. A heating pad was also suggested.    2. Syncope.  Her description of falling without recollection suggests syncope rather than vertigo. A referral to a cardiologist was made for further evaluation. She was advised to schedule an appointment with the cardiologist, whose office is located across the [address].    3. Hypercholesterolemia.  Cholesterol medication was prescribed and sent to [facility].           1. Syncope, unspecified syncope type  -     [facility]  2. Mixed hyperlipidemia  -     atorvastatin (LIPITOR) 40 MG tablet; Take 1 tablet by mouth nightly, Disp-90 tablet, R-1Normal  3. Gastroesophageal reflux disease without esophagitis  -     pantoprazole (PROTONIX) 40 MG tablet; Take 1 tablet by mouth daily, Disp-90 tablet, R-0Normal  4. Cervical radiculopathy  -     [facility] - Physical Therapy at [facility]  5. Annual physical exam  -     Vitamin D 25 Hydroxy; Future  -     TSH; Future  -     Lipid Panel; Future  -     Comprehensive Metabolic Panel; Future  -     CBC; Future  -     Hemoglobin A1C; Future  6. Hypomagnesemia  -     Magnesium; Future  7. Vitamin D deficiency  -     Vitamin D 25 Hydroxy; Future  8. Pain of right upper extremity  -     [facility] - Physical Therapy at [facility]           Explained to patient risk benefits of the medications.Advised patient to stop meds if having any side effects.Pt verbalized understanding of the instructions.    I have discussed the diagnosis with the patient and the intended plan as seen in the above orders.  The patient has received an after-visit summary and questions were answered concerning

## 2024-11-19 DIAGNOSIS — E53.8 B12 DEFICIENCY: Primary | ICD-10-CM

## 2024-11-21 DIAGNOSIS — E55.9 VITAMIN D DEFICIENCY: ICD-10-CM

## 2024-11-21 DIAGNOSIS — E83.42 HYPOMAGNESEMIA: ICD-10-CM

## 2024-11-21 DIAGNOSIS — E53.8 B12 DEFICIENCY: ICD-10-CM

## 2024-11-21 DIAGNOSIS — Z00.00 ANNUAL PHYSICAL EXAM: ICD-10-CM

## 2024-11-23 LAB
25(OH)D3 SERPL-MCNC: 45.5 NG/ML (ref 30–100)
ALBUMIN SERPL-MCNC: 4.2 G/DL (ref 3.5–5)
ALBUMIN/GLOB SERPL: 1.2 (ref 1.1–2.2)
ALP SERPL-CCNC: 86 U/L (ref 45–117)
ALT SERPL-CCNC: 45 U/L (ref 12–78)
ANION GAP SERPL CALC-SCNC: 8 MMOL/L (ref 2–12)
AST SERPL-CCNC: 34 U/L (ref 15–37)
BILIRUB SERPL-MCNC: 0.7 MG/DL (ref 0.2–1)
BUN SERPL-MCNC: 13 MG/DL (ref 6–20)
BUN/CREAT SERPL: 22 (ref 12–20)
CALCIUM SERPL-MCNC: 8.8 MG/DL (ref 8.5–10.1)
CHLORIDE SERPL-SCNC: 106 MMOL/L (ref 97–108)
CHOLEST SERPL-MCNC: 204 MG/DL
CO2 SERPL-SCNC: 24 MMOL/L (ref 21–32)
CREAT SERPL-MCNC: 0.58 MG/DL (ref 0.55–1.02)
ERYTHROCYTE [DISTWIDTH] IN BLOOD BY AUTOMATED COUNT: 13.5 % (ref 11.5–14.5)
EST. AVERAGE GLUCOSE BLD GHB EST-MCNC: 120 MG/DL
FOLATE SERPL-MCNC: 27.3 NG/ML (ref 5–21)
GLOBULIN SER CALC-MCNC: 3.5 G/DL (ref 2–4)
GLUCOSE SERPL-MCNC: 96 MG/DL (ref 65–100)
HBA1C MFR BLD: 5.8 % (ref 4–5.6)
HCT VFR BLD AUTO: 37.3 % (ref 35–47)
HDLC SERPL-MCNC: 62 MG/DL
HDLC SERPL: 3.3 (ref 0–5)
HGB BLD-MCNC: 11.9 G/DL (ref 11.5–16)
LDLC SERPL CALC-MCNC: 124.6 MG/DL (ref 0–100)
MAGNESIUM SERPL-MCNC: 2.2 MG/DL (ref 1.6–2.4)
MCH RBC QN AUTO: 29.2 PG (ref 26–34)
MCHC RBC AUTO-ENTMCNC: 31.9 G/DL (ref 30–36.5)
MCV RBC AUTO: 91.4 FL (ref 80–99)
NRBC # BLD: 0 K/UL (ref 0–0.01)
NRBC BLD-RTO: 0 PER 100 WBC
PLATELET # BLD AUTO: 216 K/UL (ref 150–400)
PMV BLD AUTO: 12.4 FL (ref 8.9–12.9)
POTASSIUM SERPL-SCNC: 4.7 MMOL/L (ref 3.5–5.1)
PROT SERPL-MCNC: 7.7 G/DL (ref 6.4–8.2)
RBC # BLD AUTO: 4.08 M/UL (ref 3.8–5.2)
SODIUM SERPL-SCNC: 138 MMOL/L (ref 136–145)
TRIGL SERPL-MCNC: 87 MG/DL
TSH SERPL DL<=0.05 MIU/L-ACNC: 0.47 UIU/ML (ref 0.36–3.74)
VIT B12 SERPL-MCNC: 1072 PG/ML (ref 193–986)
VLDLC SERPL CALC-MCNC: 17.4 MG/DL
WBC # BLD AUTO: 6 K/UL (ref 3.6–11)

## 2024-11-25 ENCOUNTER — OFFICE VISIT (OUTPATIENT)
Dept: PRIMARY CARE CLINIC | Facility: CLINIC | Age: 59
End: 2024-11-25
Payer: COMMERCIAL

## 2024-11-25 VITALS
RESPIRATION RATE: 16 BRPM | HEART RATE: 60 BPM | SYSTOLIC BLOOD PRESSURE: 132 MMHG | DIASTOLIC BLOOD PRESSURE: 76 MMHG | BODY MASS INDEX: 27.75 KG/M2 | OXYGEN SATURATION: 100 % | HEIGHT: 61 IN | WEIGHT: 147 LBS | TEMPERATURE: 97.1 F

## 2024-11-25 DIAGNOSIS — Z00.00 ANNUAL PHYSICAL EXAM: Primary | ICD-10-CM

## 2024-11-25 DIAGNOSIS — R53.83 OTHER FATIGUE: ICD-10-CM

## 2024-11-25 DIAGNOSIS — M81.0 AGE-RELATED OSTEOPOROSIS WITHOUT CURRENT PATHOLOGICAL FRACTURE: ICD-10-CM

## 2024-11-25 DIAGNOSIS — R61 NIGHT SWEATS: ICD-10-CM

## 2024-11-25 DIAGNOSIS — K21.9 GASTROESOPHAGEAL REFLUX DISEASE WITHOUT ESOPHAGITIS: ICD-10-CM

## 2024-11-25 LAB
T3FREE SERPL-MCNC: 2.9 PG/ML (ref 2.2–4)
T4 FREE SERPL-MCNC: 1.2 NG/DL (ref 0.8–1.5)

## 2024-11-25 PROCEDURE — 99396 PREV VISIT EST AGE 40-64: CPT | Performed by: INTERNAL MEDICINE

## 2024-11-25 SDOH — ECONOMIC STABILITY: FOOD INSECURITY: WITHIN THE PAST 12 MONTHS, YOU WORRIED THAT YOUR FOOD WOULD RUN OUT BEFORE YOU GOT MONEY TO BUY MORE.: NEVER TRUE

## 2024-11-25 SDOH — ECONOMIC STABILITY: FOOD INSECURITY: WITHIN THE PAST 12 MONTHS, THE FOOD YOU BOUGHT JUST DIDN'T LAST AND YOU DIDN'T HAVE MONEY TO GET MORE.: NEVER TRUE

## 2024-11-25 SDOH — ECONOMIC STABILITY: INCOME INSECURITY: HOW HARD IS IT FOR YOU TO PAY FOR THE VERY BASICS LIKE FOOD, HOUSING, MEDICAL CARE, AND HEATING?: NOT HARD AT ALL

## 2024-11-25 ASSESSMENT — PATIENT HEALTH QUESTIONNAIRE - PHQ9
SUM OF ALL RESPONSES TO PHQ QUESTIONS 1-9: 0
2. FEELING DOWN, DEPRESSED OR HOPELESS: NOT AT ALL
SUM OF ALL RESPONSES TO PHQ QUESTIONS 1-9: 0
1. LITTLE INTEREST OR PLEASURE IN DOING THINGS: NOT AT ALL
SUM OF ALL RESPONSES TO PHQ QUESTIONS 1-9: 0
SUM OF ALL RESPONSES TO PHQ QUESTIONS 1-9: 0
SUM OF ALL RESPONSES TO PHQ9 QUESTIONS 1 & 2: 0

## 2024-11-25 NOTE — PROGRESS NOTES
Tee Berry is a 59 y.o. female and presents with     Chief Complaint   Patient presents with    Annual Exam    Medication Refill       History of Present Illness  The patient presents for evaluation of multiple medical concerns.    She reports a slight elevation in her cholesterol levels, which she believes she can manage.     She mentions a decrease in muscle mass, particularly in her wrist, which has become noticeably smaller. Despite practicing yoga, she feels her muscle mass is not improving. She has been taking magnesium supplements, which she finds helpful for sleep, but stopped in 10/2024. She also takes B12 supplements daily but does not take folate.    She experiences frequent night sweats, which leave her drenched and unable to return to bed after using the bathroom.    She suspects her hernia has recurred, as she feels movement when sitting and notices a bulge. She has had hernia surgery in the past.    She also experiences heart palpitations at night, which disrupt her sleep.    She occasionally suffers from acid reflux and has bowel movements up to four times a day. She underwent a colonoscopy last year, which was normal, and was advised to repeat it in five years.    She was previously on Prolia for osteoporosis, but did not find it effective and had issues with insurance coverage. She has osteoporosis in her hip.         Past Medical History:   Diagnosis Date    Adverse effect of anesthesia     Urinary retention    GERD (gastroesophageal reflux disease)     Hypercholesteremia     Hypercholesterolemia     Osteoporosis      Past Surgical History:   Procedure Laterality Date    BREAST REDUCTION SURGERY Bilateral 10/28/2019    BILATERAL BREAST REDUCTION performed by Korey eGe MD at [facility] AMBULAT[facility] OR    COLONOSCOPY      internal hemroid 2009    HEMORRHOID SURGERY  2009    HERNIA REPAIR  2004     Current Outpatient Medications   Medication Sig    pregabalin (LYRICA) 50 MG capsule Take 1

## 2024-11-25 NOTE — PROGRESS NOTES
\"Have you been to the ER, urgent care clinic since your last visit?  Hospitalized since your last visit?\"    NO    “Have you seen or consulted any other health care providers outside of [facility] since your last visit?”    NO     “Have you had a pap smear?”    [facility]     Date of last Cervical Cancer screen (HPV or PAP): 5/15/2019             Click Here for Release of Records Request

## 2024-12-06 ENCOUNTER — HOSPITAL ENCOUNTER (OUTPATIENT)
Facility: HOSPITAL | Age: 59
Discharge: HOME | End: 2024-12-09
Payer: COMMERCIAL

## 2024-12-06 DIAGNOSIS — R61 NIGHT SWEATS: ICD-10-CM

## 2024-12-06 DIAGNOSIS — R53.83 OTHER FATIGUE: ICD-10-CM

## 2024-12-06 PROCEDURE — 74177 CT ABD & PELVIS W/CONTRAST: CPT

## 2024-12-06 PROCEDURE — 6360000004 HC RX CONTRAST MEDICATION: Performed by: INTERNAL MEDICINE

## 2024-12-06 PROCEDURE — 71260 CT THORAX DX C+: CPT

## 2024-12-06 RX ORDER — IOPAMIDOL 755 MG/ML
100 INJECTION, SOLUTION INTRAVASCULAR
Status: COMPLETED | OUTPATIENT
Start: 2024-12-06 | End: 2024-12-06

## 2024-12-06 RX ADMIN — IOPAMIDOL 100 ML: 755 INJECTION, SOLUTION INTRAVENOUS at 09:26

## 2024-12-11 DIAGNOSIS — M81.0 AGE-RELATED OSTEOPOROSIS WITHOUT CURRENT PATHOLOGICAL FRACTURE: ICD-10-CM

## 2024-12-11 RX ORDER — ALENDRONATE SODIUM 70 MG/1
70 TABLET ORAL
Qty: 12 TABLET | Refills: 1 | Status: SHIPPED | OUTPATIENT
Start: 2024-12-11

## 2024-12-11 NOTE — TELEPHONE ENCOUNTER
PCP: Floyd Gerber MD    Last Visit 11/25/2024   Future Appointments   Date Time Provider Department Center   1/2/2025 11:00 AM Soham Bravo MD [facility]       Requested Prescriptions     Pending Prescriptions Disp Refills    alendronate (FOSAMAX) 70 MG tablet 12 tablet 1     Sig: Take 1 tablet by mouth every 7 days         Other Comments: Last Refill

## 2025-01-02 ENCOUNTER — OFFICE VISIT (OUTPATIENT)
Age: 60
End: 2025-01-02
Payer: COMMERCIAL

## 2025-01-02 ENCOUNTER — TELEPHONE (OUTPATIENT)
Age: 60
End: 2025-01-02

## 2025-01-02 VITALS
HEIGHT: 61 IN | HEART RATE: 73 BPM | SYSTOLIC BLOOD PRESSURE: 136 MMHG | BODY MASS INDEX: 27.38 KG/M2 | OXYGEN SATURATION: 99 % | DIASTOLIC BLOOD PRESSURE: 82 MMHG | WEIGHT: 145 LBS

## 2025-01-02 DIAGNOSIS — R00.2 PALPITATIONS: ICD-10-CM

## 2025-01-02 DIAGNOSIS — R55 SYNCOPE, UNSPECIFIED SYNCOPE TYPE: ICD-10-CM

## 2025-01-02 DIAGNOSIS — Z76.89 ENCOUNTER TO ESTABLISH CARE: Primary | ICD-10-CM

## 2025-01-02 PROCEDURE — 93000 ELECTROCARDIOGRAM COMPLETE: CPT | Performed by: SPECIALIST

## 2025-01-02 PROCEDURE — 99204 OFFICE O/P NEW MOD 45 MIN: CPT | Performed by: SPECIALIST

## 2025-01-02 NOTE — TELEPHONE ENCOUNTER
Enrolled with [employer] - Ordered and being shipped to patient's home address on file.  ETA within 5-7 business days.         2 week holter  Received: Today  Opal Naranjo, Jaymie Coates; Carol Sims; Karla Stallworth, RN  Please mail 2 week holter for palpitations. ThanksOpal

## 2025-01-02 NOTE — PROGRESS NOTES
[facility]                                     DIVISION OF CARDIOLOGY                                                                             OFFICE NOTE                  SAMARA MOSCOSO M.D. , GENNY            MAREN ROMAN   1965  [id number]    Date/Time:  1/2/202511:39 AM      There were no vitals taken for this visit.       Wt Readings from Last 3 Encounters:   11/25/24 66.7 kg (147 lb)   10/08/24 64.4 kg (142 lb)   09/28/23 66.2 kg (146 lb)          Lab Results   Component Value Date    CHOL 204 (H) 11/21/2024    TRIG 87 11/21/2024    HDL 62 11/21/2024    VLDL 17.4 11/21/2024    CHOLHDLRATIO 3.3 11/21/2024              SUBJECTIVE:  She has been noticing diaphoresis at night.  Also noticing some numbness in the left arm mostly at night.    Episode of syncope x 2 not preceded by any symptoms reported.  She tells me her blood pressure is low at home.    Palpitation occurring almost 2-3 times a week which wake her up from sleep.    No chest pain reported.       Assessment/Plan    1.  Palpitations: Proceed with 2 weeks Holter monitor.    Her EKG today reveals a normal sinus rhythm poor R wave progression in the anteroseptal leads but normal intervals.    2.  Syncope: Proceed with stress echocardiogram and carotid ultrasound.    3.  Hypotension?:  Blood pressure seems completely normal here but she tells me at home is around 90 systolic.  Proceed with blood pressure log for 2 weeks.  Discussed liberalization of salt for now.    4.  Hyperlipidemia: On statin physician.  Expressed her back in the next 3 to 4 weeks after above-mentioned test have been completed          HPI   59 years old female with the past medical history remarkable mostly for hyperlipidemia gastroesophageal reflux disease and neuropathy who presents today for cardiac evaluation of palpitation and syncope and shortness of breath.    She is also complaining of diaphoresis at night of unclear etiology.    She

## 2025-01-02 NOTE — PATIENT INSTRUCTIONS
You will be scheduled for a stress echocardiogram and a carotid doppler after your appointment today    Please wear comfortable clothing (shorts or pants with a shirt or blouse) and walking/athletic shoes.    Do not eat or drink anything, except water, for at least 2 hours prior to your test.    Do take your scheduled medications prior to your test.    Record blood pressure/heart rate daily.  Remember to sit for at least 3 minutes.  Have both feet flat on floor and arm at heart level. Bring readings to follow up appointment.     A 14 day  monitor has been ordered for you.  This will be mailed to the address given to us.  Please read over the instructions given to you about monitors. Save the pre-paid box so that you can use it to mail monitor back to company. Your heart rate and rhythm will be monitored continuously .   If you have any insurance questions regarding coverage and out of pocket cost please contact the number on the instructions.     Schedule follow up with Dr. Bravo after the above tests.

## 2025-02-03 ENCOUNTER — TELEPHONE (OUTPATIENT)
Age: 60
End: 2025-02-03

## 2025-02-03 NOTE — TELEPHONE ENCOUNTER
Hey ladies,    Sorry DO NOT CANCEL her appt... [facility]    Thanks~

## 2025-02-03 NOTE — TELEPHONE ENCOUNTER
Good Afternoon,    The StressEcho Dr. Bravo ordered was DENIED by Dwain because:   Clinical Rationale:   Your doctor told us that you have fainted. Your doctor is checking you for heart disease. Your doctor ordered a special heart test. This test measures blood flow to the heart. This test is used when you are at moderate or high risk for heart disease. This test should be used when you have certain conditions, such as kidney disease, stroke or widening of the largest blood vessel in the body (abdominal aortic aneurysm). This test is also needed when there are certain abnormal findings on your EKG that would cause a treadmill stress test to be unclear. We reviewed the notes we received. The notes do not show that you have one of these conditions. The notes do not show that you had such abnormal findings on your EKG. So, we cannot approve this request as medically necessary. We used [facility] Medical Benefits Management Clinical Guideline titled Imaging of the Heart, Stress Echocardiography to make this decision. You may view this guideline at [email or web address].com/mbm-guidelines-[email or web address].       If a Peer to Peer needed to be done Dwain can be reached at [phone] with an order ID of [id number] or call the patient to CANCEL this appt    Thanks~

## 2025-02-14 DIAGNOSIS — E78.2 MIXED HYPERLIPIDEMIA: ICD-10-CM

## 2025-02-17 RX ORDER — ATORVASTATIN CALCIUM 40 MG/1
40 TABLET, FILM COATED ORAL NIGHTLY
Qty: 90 TABLET | Refills: 1 | Status: SHIPPED | OUTPATIENT
Start: 2025-02-17

## 2025-02-17 NOTE — TELEPHONE ENCOUNTER
PCP: Floyd Gerber MD    Last Visit 11/25/2024   Future Appointments   Date Time Provider Department Center   4/4/2025  9:00 AM [facility] [place] [facility]       Requested Prescriptions     Pending Prescriptions Disp Refills    atorvastatin (LIPITOR) 40 MG tablet [Pharmacy Med Name: ATORVASTATIN 40MG TABLETS] 90 tablet 1     Sig: TAKE 1 TABLET BY MOUTH EVERY NIGHT         Other Comments: Last Refill   10/18/24

## 2025-02-24 ENCOUNTER — OFFICE VISIT (OUTPATIENT)
Age: 60
End: 2025-02-24
Payer: COMMERCIAL

## 2025-02-24 VITALS
BODY MASS INDEX: 26.81 KG/M2 | HEIGHT: 61 IN | RESPIRATION RATE: 16 BRPM | HEART RATE: 65 BPM | WEIGHT: 142 LBS | OXYGEN SATURATION: 99 % | DIASTOLIC BLOOD PRESSURE: 80 MMHG | SYSTOLIC BLOOD PRESSURE: 120 MMHG

## 2025-02-24 DIAGNOSIS — R55 SYNCOPE, UNSPECIFIED SYNCOPE TYPE: Primary | ICD-10-CM

## 2025-02-24 PROCEDURE — 99214 OFFICE O/P EST MOD 30 MIN: CPT | Performed by: SPECIALIST

## 2025-02-24 RX ORDER — METOPROLOL SUCCINATE 25 MG/1
12.5 TABLET, EXTENDED RELEASE ORAL NIGHTLY PRN
Qty: 45 TABLET | Refills: 1 | Status: SHIPPED | OUTPATIENT
Start: 2025-02-24

## 2025-02-24 RX ORDER — CHOLECALCIFEROL (VITAMIN D3) 25 MCG
CAPSULE ORAL DAILY
COMMUNITY

## 2025-02-24 ASSESSMENT — PATIENT HEALTH QUESTIONNAIRE - PHQ9
SUM OF ALL RESPONSES TO PHQ9 QUESTIONS 1 & 2: 0
SUM OF ALL RESPONSES TO PHQ QUESTIONS 1-9: 0
2. FEELING DOWN, DEPRESSED OR HOPELESS: NOT AT ALL
1. LITTLE INTEREST OR PLEASURE IN DOING THINGS: NOT AT ALL
SUM OF ALL RESPONSES TO PHQ QUESTIONS 1-9: 0

## 2025-02-24 NOTE — PROGRESS NOTES
[facility]                                     DIVISION OF CARDIOLOGY                                                                             OFFICE NOTE                  SAMARA MOSCOSO M.D. , GENNY            MAREN OTOOLE MASKAL   1965  [id number]    Date/Time:  2/24/202511:03 AM      /80   Pulse 65   Resp 16   Ht 1.549 m (5' 1\")   Wt 64.4 kg (142 lb)   SpO2 99%   BMI 26.83 kg/m²        Wt Readings from Last 3 Encounters:   02/24/25 64.4 kg (142 lb)   02/10/25 65.8 kg (145 lb)   01/02/25 65.8 kg (145 lb)          Lab Results   Component Value Date    CHOL 204 (H) 11/21/2024    TRIG 87 11/21/2024    HDL 62 11/21/2024    VLDL 17.4 11/21/2024    CHOLHDLRATIO 3.3 11/21/2024              SUBJECTIVE:  No chest pain.  Palpitations mostly occurring every night not very often she tells me.    Has had no recurrent syncope or dizziness.  All significance.    No particular shortness of breath she remains active.       Assessment/Plan    1.  Palpitations: We have discussed at length results of Holter monitor January 2025 with predominant normal sinus rhythm 1 episode a heart rate of 40 but asymptomatic and occasional episode of atrial tachycardia.    Significance and implication of atrial tachycardia is been discussed with the patient we have discussed potential pharmacological interventions she wants to hold off and not take her medication on a constant basis.    I will prescribe Toprol-XL 12.5 mg nightly as needed at this time.    Her EKG today reveals a normal sinus rhythm poor R wave progression in the anteroseptal leads but normal intervals.     2.  Syncope: No recurrent at this point.  Stress echo essentially normal with borderline EKG changes although we have discussed potential for false positive in young females as well but at this point hold off any further interventions.    3.  Hypotension?:  Her blood pressure is much better and she has had no low blood pressure at

## 2025-04-04 ENCOUNTER — TRANSCRIBE ORDERS (OUTPATIENT)
Facility: HOSPITAL | Age: 60
End: 2025-04-04

## 2025-04-04 ENCOUNTER — HOSPITAL ENCOUNTER (OUTPATIENT)
Facility: HOSPITAL | Age: 60
Discharge: HOME | End: 2025-04-04
Payer: COMMERCIAL

## 2025-04-04 DIAGNOSIS — Z12.31 OTHER SCREENING MAMMOGRAM: Primary | ICD-10-CM

## 2025-04-04 DIAGNOSIS — Z12.31 OTHER SCREENING MAMMOGRAM: ICD-10-CM

## 2025-04-04 PROCEDURE — 77063 BREAST TOMOSYNTHESIS BI: CPT

## 2025-04-05 ENCOUNTER — RESULTS FOLLOW-UP (OUTPATIENT)
Dept: PRIMARY CARE CLINIC | Facility: CLINIC | Age: 60
End: 2025-04-05

## 2025-06-06 DIAGNOSIS — M81.0 AGE-RELATED OSTEOPOROSIS WITHOUT CURRENT PATHOLOGICAL FRACTURE: ICD-10-CM

## 2025-06-07 RX ORDER — ALENDRONATE SODIUM 70 MG/1
70 TABLET ORAL
Qty: 12 TABLET | Refills: 1 | Status: SHIPPED | OUTPATIENT
Start: 2025-06-07

## 2025-08-28 DIAGNOSIS — E78.2 MIXED HYPERLIPIDEMIA: ICD-10-CM

## 2025-08-28 RX ORDER — ATORVASTATIN CALCIUM 40 MG/1
40 TABLET, FILM COATED ORAL NIGHTLY
Qty: 90 TABLET | Refills: 0 | Status: SHIPPED | OUTPATIENT
Start: 2025-08-28

## (undated) DEVICE — SOLUTION IV 1000ML 0.9% SOD CHL

## (undated) DEVICE — SUT ETHLN 3-0 18IN PS2 BLK --

## (undated) DEVICE — BANDAGE COBAN 4 IN COMPR W4INXL5YD FOAM COHESIVE QUIK STK SELF ADH SFT

## (undated) DEVICE — STERILE POLYISOPRENE POWDER-FREE SURGICAL GLOVES: Brand: PROTEXIS

## (undated) DEVICE — BRA SURG POST-OP LG 42IN --

## (undated) DEVICE — CANISTER, RIGID, 3000CC: Brand: MEDLINE INDUSTRIES, INC.

## (undated) DEVICE — STAPLER SKIN H3.9MM WIRE DIA0.58MM CRWN 6.9MM 35 STPL ROT

## (undated) DEVICE — DERMABOND SKIN ADH 0.7ML -- DERMABOND ADVANCED 12/BX

## (undated) DEVICE — SUTURE MCRYL SZ 5-0 L18IN ABSRB UD L13MM P-3 3/8 CIR PRIM Y493G

## (undated) DEVICE — DRAPE FLD WRM W44XL66IN C6L FOR INTRATEMP SYS THERMABASIN

## (undated) DEVICE — AEGIS 1" DISK 4MM HOLE, PEEL OPEN: Brand: MEDLINE

## (undated) DEVICE — REM POLYHESIVE ADULT PATIENT RETURN ELECTRODE: Brand: VALLEYLAB

## (undated) DEVICE — SUTURE MCRYL SZ 2-0 L27IN ABSRB UD SH L26MM TAPERPOINT NDL Y417H

## (undated) DEVICE — STERILE POLYISOPRENE POWDER-FREE SURGICAL GLOVES WITH EMOLLIENT COATING: Brand: PROTEXIS

## (undated) DEVICE — SPONGE GZ W4XL4IN COT 12 PLY TYP VII WVN C FLD DSGN

## (undated) DEVICE — SOLUTION LACTATED RINGERS INJECTION USP

## (undated) DEVICE — RESERVOIR,SUCTION,100CC,SILICONE: Brand: MEDLINE

## (undated) DEVICE — BRA SURG POST-OP XL 46IN --

## (undated) DEVICE — Z DISCONTINUED USE (MFG CAT 7984-37) SOLUTION IV SODIUM CHL 0.9% 100 ML INJ

## (undated) DEVICE — SUTURE VCRL SZ 0 L27IN ABSRB UD SH L26MM 1/2 CIR TAPERPOINT J418H

## (undated) DEVICE — PLASMABLADE PS210-030S 3.0S LOCK: Brand: PLASMABLADE™

## (undated) DEVICE — TUBING SUCT L9FT FOR AUTOFUSE INFLTR SYS

## (undated) DEVICE — TRAY PREP DRY W/ PREM GLV 2 APPL 6 SPNG 2 UNDPD 1 OVERWRAP

## (undated) DEVICE — INFECTION CONTROL KIT SYS

## (undated) DEVICE — SUTURE MCRYL SZ 3-0 L27IN ABSRB UD L19MM PS-2 3/8 CIR PRIM Y427H

## (undated) DEVICE — DRAIN SURG 19FR 100% SIL RADPQ RND CHN FULL FLUT

## (undated) DEVICE — SMOKE EVACUATION PENCIL: Brand: VALLEYLAB

## (undated) DEVICE — STRAP,POSITIONING,KNEE/BODY,FOAM,4X60": Brand: MEDLINE

## (undated) DEVICE — COVER LT HNDL PLAS RIG 1 PER PK

## (undated) DEVICE — Z DISCONTINUED PER MEDLINE PACK PROCEDURE SURG PLAS

## (undated) DEVICE — BLANKET WRM W35.4XL86.6IN FULL UNDERBODY + FORC AIR

## (undated) DEVICE — TUBING, SUCTION, 1/4" X 10', STRAIGHT: Brand: MEDLINE

## (undated) DEVICE — SUT PROL 0 30IN CT1 BLU --

## (undated) DEVICE — DRAPE,CHEST,FENES,15X10,STERIL: Brand: MEDLINE

## (undated) DEVICE — 3M™ TEGADERM™ TRANSPARENT FILM DRESSING FRAME STYLE, 1626W, 4 IN X 4-3/4 IN (10 CM X 12 CM), 50/CT 4CT/CASE: Brand: 3M™ TEGADERM™